# Patient Record
Sex: MALE | Employment: OTHER | URBAN - METROPOLITAN AREA
[De-identification: names, ages, dates, MRNs, and addresses within clinical notes are randomized per-mention and may not be internally consistent; named-entity substitution may affect disease eponyms.]

---

## 2017-04-03 DIAGNOSIS — L21.9 DERMATITIS, SEBORRHEIC: ICD-10-CM

## 2017-04-03 DIAGNOSIS — L71.9 ACNE ROSACEA: ICD-10-CM

## 2017-04-03 RX ORDER — FLUOCINONIDE TOPICAL SOLUTION USP, 0.05% 0.5 MG/ML
SOLUTION TOPICAL 2 TIMES DAILY
Qty: 60 ML | Refills: 3 | Status: SHIPPED | OUTPATIENT
Start: 2017-04-03 | End: 2018-04-30

## 2017-04-06 DIAGNOSIS — L71.9 ACNE ROSACEA: Primary | ICD-10-CM

## 2017-04-19 ENCOUNTER — OFFICE VISIT (OUTPATIENT)
Dept: DERMATOLOGY | Facility: CLINIC | Age: 77
End: 2017-04-19

## 2017-04-19 VITALS — HEART RATE: 55 BPM | SYSTOLIC BLOOD PRESSURE: 118 MMHG | DIASTOLIC BLOOD PRESSURE: 73 MMHG

## 2017-04-19 DIAGNOSIS — L71.9 ACNE ROSACEA: Primary | ICD-10-CM

## 2017-04-19 DIAGNOSIS — L30.9 DERMATITIS: ICD-10-CM

## 2017-04-19 RX ORDER — METOPROLOL TARTRATE 25 MG/1
25 TABLET, FILM COATED ORAL
COMMUNITY
Start: 2017-04-04 | End: 2023-02-08

## 2017-04-19 ASSESSMENT — PAIN SCALES - GENERAL: PAINLEVEL: NO PAIN (0)

## 2017-04-19 NOTE — NURSING NOTE
"Dermatology Rooming Note    Fredrick Quinn's goals for this visit include:   Chief Complaint   Patient presents with     Derm Problem     Fredrick is here today for a skin check. Fredrick notes\" I have a few concerns on my face and scalp\"     Natalya Dowell MA    "

## 2017-04-19 NOTE — PROGRESS NOTES
DERMATOLOGY PROBLEM LIST:   1.  Rosacea.  Recent switch from Azelex due to insurance to metronidazole cream.   2.  Eczematous dermatitis.   3.  Seborrheic dermatitis.   4.  History of basal cell carcinoma, forehead, status post Mohs 08/07/2015.      CHIEF COMPLAINT:  Spots.      HISTORY OF PRESENT ILLNESS:  The patient is a 76-year-old male well known to me who returns today earlier than expected for some concerning spots.  He does have a history of nonmelanoma skin cancer.  I have lately been corresponding with the patient due to refills.  He needed a refill on both his ketoconazole shampoo and Lidex solution he uses when he has a flare of scalp pruritus.  He feels that they help but he does still occasionally get pimples within the area.  His insurance made a switch from azelaic acid which had controlled him well for many years to MetroCream; he has not started it yet.  He does have a few areas flaring on his chin and nose.  His eczema continues to be under good control.  He is doing daily emollients.  He uses his Diprolene and desoximetasone and/or betamethasone for symptoms and this has been doing well for him.  He has noticed some thinning of his skin.  The 2 areas that he has of concern today are a brown patch on his cheek and a mole area that he feels in the shower under his left axilla.      PHYSICAL EXAMINATION:  The patient is a well-appearing male in no acute distress.  Please note the nursing note for vital signs.  Scalp, face, trunk, upper and lower extremities are examined today.  He has a few scattered benign-appearing nevi, no atypia.  In his left axilla, he has a fleshy dermal nevus without atypia.  On his right cheek, he has a waxy, stuck-on papule.  On dermoscopy, it has features of seborrheic keratosis.  He has scattered inflammatory papules and pustules of his nose and chin.  Scalp today is clear.  No eczematous plaques and he is well moisturized.      ASSESSMENT AND PLAN:   1.  Eczematous  dermatitis, well controlled.  Continue emollients, betamethasone, Diprolene and desoximetasone as needed.   2.  Seborrheic dermatitis.  Although the patient feels that he still has some areas active, he is doing very well.  He will continue the ketoconazole shampoo.  He is using it 5 times weekly and Lidex solution as needed for flares.   3.  Rosacea.  He will start his metronidazole cream.  I did discuss sometimes people with rosacea respond to one product but not the other.  If it is not kicking in, in 8 weeks, he is to let me know.   4.  Seborrheic keratosis, right cheek.  Reassurance.   5.  Intradermal nevus, left axilla.  Reassurance.     I will plan to see the patient back in 1 year.

## 2017-04-19 NOTE — LETTER
4/19/2017       RE: Fredrick Quinn  [address] [address]     Dear Colleague,    Thank you for referring your patient, Fredrick Quinn, to the [facility] DERMATOLOGY at [facility]. Please see a copy of my visit note below.    DERMATOLOGY PROBLEM LIST:   1.  Rosacea.  Recent switch from Azelex due to insurance to metronidazole cream.   2.  Eczematous dermatitis.   3.  Seborrheic dermatitis.   4.  History of basal cell carcinoma, forehead, status post Mohs 08/07/2015.      CHIEF COMPLAINT:  Spots.      HISTORY OF PRESENT ILLNESS:  The patient is a 76-year-old male well known to me who returns today earlier than expected for some concerning spots.  He does have a history of nonmelanoma skin cancer.  I have lately been corresponding with the patient due to refills.  He needed a refill on both his ketoconazole shampoo and Lidex solution he uses when he has a flare of scalp pruritus.  He feels that they help but he does still occasionally get pimples within the area.  His insurance made a switch from azelaic acid which had controlled him well for many years to MetroCream; he has not started it yet.  He does have a few areas flaring on his chin and nose.  His eczema continues to be under good control.  He is doing daily emollients.  He uses his Diprolene and desoximetasone and/or betamethasone for symptoms and this has been doing well for him.  He has noticed some thinning of his skin.  The 2 areas that he has of concern today are a brown patch on his cheek and a mole area that he feels in the shower under his left axilla.      PHYSICAL EXAMINATION:  The patient is a well-appearing male in no acute distress.  Please note the nursing note for vital signs.  Scalp, face, trunk, upper and lower extremities are examined today.  He has a few scattered benign-appearing nevi, no atypia.  In his left axilla, he has a fleshy dermal nevus without atypia.  On his right cheek, he  has a waxy, stuck-on papule.  On dermoscopy, it has features of seborrheic keratosis.  He has scattered inflammatory papules and pustules of his nose and chin.  Scalp today is clear.  No eczematous plaques and he is well moisturized.      ASSESSMENT AND PLAN:   1.  Eczematous dermatitis, well controlled.  Continue emollients, betamethasone, Diprolene and desoximetasone as needed.   2.  Seborrheic dermatitis.  Although the patient feels that he still has some areas active, he is doing very well.  He will continue the ketoconazole shampoo.  He is using it 5 times weekly and Lidex solution as needed for flares.   3.  Rosacea.  He will start his metronidazole cream.  I did discuss sometimes people with rosacea respond to one product but not the other.  If it is not kicking in, in 8 weeks, he is to let me know.   4.  Seborrheic keratosis, right cheek.  Reassurance.   5.  Intradermal nevus, left axilla.  Reassurance.     I will plan to see the patient back in 1 year.     Again, thank you for allowing me to participate in the care of your patient.      Sincerely,    Coleen Simth MD

## 2017-04-19 NOTE — MR AVS SNAPSHOT
After Visit Summary   4/19/2017    Fredrick Quinn    MRN: [id number]           Patient Information     Date Of Birth          1940        Visit Information        Provider Department      4/19/2017 8:45 AM Coleen Smith MD [facility] Dermatology        Today's Diagnoses     Acne rosacea    -  1    Dermatitis           Follow-ups after your visit        Follow-up notes from your care team     Return in about 1 year (around 4/19/2018).      Who to contact     Please call your clinic at [phone] to:    Ask questions about your health    Make or cancel appointments    Discuss your medicines    Learn about your test results    Speak to your doctor   If you have compliments or concerns about an experience at your clinic, or if you wish to file a complaint, please contact [facility] Patient Relations at [phone] or email us at [email or web address]         Additional Information About Your Visit        MyChart Information     [employer] gives you secure access to your electronic health record. If you see a primary care provider, you can also send messages to your care team and make appointments. If you have questions, please call your primary care clinic.  If you do not have a primary care provider, please call [phone] and they will assist you.      [employer] is an electronic gateway that provides easy, online access to your medical records. With [employer], you can request a clinic appointment, read your test results, renew a prescription or communicate with your care team.     To access your existing account, please contact your [facility] or call [phone] for assistance.        Care EveryWhere ID     This is your Care EveryWhere ID. This could be used by other organizations to access your [place] medical records  [id number]        Your Vitals Were     Pulse                   55            Blood Pressure from Last 3  Encounters:   04/19/17 118/73   09/20/16 110/68   08/07/15 118/67    Weight from Last 3 Encounters:   07/21/15 74.9 kg (165 lb 3.2 oz)   07/22/14 75.5 kg (166 lb 6.4 oz)   08/12/13 76.7 kg (169 lb 3.2 oz)              Today, you had the following     No orders found for display         Today's Medication Changes          These changes are accurate as of: 4/19/17 11:59 PM.  If you have any questions, ask your nurse or doctor.               These medicines have changed or have updated prescriptions.        Dose/Directions    betamethasone dipropionate 0.05 % cream   Commonly known as:  DIPROSONE   This may have changed:  Another medication with the same name was removed. Continue taking this medication, and follow the directions you see here.   Used for:  Other eczema   Changed by:  Coleen Smith MD        Apply 0.5 inches topically 2 times daily.   Quantity:  180 g   Refills:  4                Primary Care Provider Office Phone # Fax #    Sahil Corky Salvador [phone]       [facility] [address]  [place] [address]        Thank you!     Thank you for choosing [facility] DERMATOLOGY  for your care. Our goal is always to provide you with excellent care. Hearing back from our patients is one way we can continue to improve our services. Please take a few minutes to complete the written survey that you may receive in the mail after your visit with us. Thank you!             Your Updated Medication List - Protect others around you: Learn how to safely use, store and throw away your medicines at www.disposemymeds.org.          This list is accurate as of: 4/19/17 11:59 PM.  Always use your most recent med list.                   Brand Name Dispense Instructions for use    aspirin 325 MG tablet      Take 1 tablet by mouth daily       azelaic acid 20 % cream    AZELEX    50 g    Apply to face daily.       betamethasone dipropionate 0.05 % cream    DIPROSONE    180 g    Apply 0.5 inches  topically 2 times daily.       Desoximetasone 0.05 % Crea     60 g    Use daily to rash       FLAXSEED OIL PO      Take by mouth daily       fluocinonide 0.05 % solution    LIDEX    60 mL    Apply topically 2 times daily       furosemide 20 MG tablet    LASIX     Take 20 mg by mouth daily       ketoconazole 2 % shampoo    NIZORAL    120 mL    Use daily to scalp       LIPITOR 80 MG tablet   Generic drug:  atorvastatin      Take 80 mg by mouth daily       losartan 25 MG tablet    COZAAR     Take 25 mg by mouth daily       metoprolol 25 MG 24 hr tablet    TOPROL-XL     Take 25 mg by mouth 2 times daily       metoprolol 25 MG tablet    LOPRESSOR     Take 25 mg by mouth       metroNIDAZOLE 0.75 % cream    METROCREAM    45 g    Apply topically 2 times daily       MULTIVITAL PO      Take 1 tablet by mouth daily       omeprazole 40 MG capsule    priLOSEC     Take 40 mg by mouth daily       potassium chloride SA 10 MEQ CR tablet    K-DUR/KLOR-CON M     Take by mouth 2 times daily

## 2017-06-29 ENCOUNTER — TELEPHONE (OUTPATIENT)
Dept: DERMATOLOGY | Facility: CLINIC | Age: 77
End: 2017-06-29

## 2017-06-29 NOTE — TELEPHONE ENCOUNTER
PA Initiation    Medication: azelaic acid (Azelex) 20% cream  Insurance Company: [employer] - Phone [phone] Fax [phone]  Pharmacy Filling the Rx: [employer] [facility] - [facility] [address]  Filling Pharmacy Phone:    Filling Pharmacy Fax:    Start Date:  6/29/17    ** Pt on Azelex since 2012 but plan no longer. Med worked well for pt. Switched to Metrocream in April for rosacea and not controlling symptoms.

## 2017-07-05 NOTE — TELEPHONE ENCOUNTER
PRIOR AUTHORIZATION DENIED    Medication: azelaic acid (Azelex) 20% cream    Denial Date: 6/30/2017    Denial Rational: Pt hasn't tried and failed all listed first-line therapies    Appeal Information: Yes - mail or fax

## 2017-07-07 NOTE — TELEPHONE ENCOUNTER
Medication Appeal Initiation    We have initiated an appeal for the requested medication:  Medication: azelaic acid (Azelex) 20% cream - DENIED, APPEALING  Appeal Start Date:  7/7/2017  Insurance Company: [employer] - Phone [phone] Fax [phone]  Comments:   Appeal with LMN and chart notes faxed to [employer] via fax# [phone]

## 2017-07-11 NOTE — TELEPHONE ENCOUNTER
MEDICATION APPEAL APPROVED    Medication: azelaic acid (Azelex) 20% cream - DENIED, APPEAL approved  Authorization Effective Date: 7/10/2017  Authorization Expiration Date: 12/31/2017  Approved Dose/Quantity:   Reference #: [id number]   Insurance Company: [employer] - Phone [phone] Fax [phone]  Expected CoPay: n/a     CoPay Card Available:      Foundation Assistance Needed:    Which Pharmacy is filling the prescription (Not needed for infusion/clinic administered): [employer] [facility] - [facility] [address]    Left pt a message to contact his pharmacy to set up delivery.

## 2017-09-26 ENCOUNTER — OFFICE VISIT (OUTPATIENT)
Dept: DERMATOLOGY | Facility: CLINIC | Age: 77
End: 2017-09-26

## 2017-09-26 DIAGNOSIS — L73.9 FOLLICULITIS: Primary | ICD-10-CM

## 2017-09-26 DIAGNOSIS — L21.9 DERMATITIS, SEBORRHEIC: ICD-10-CM

## 2017-09-26 DIAGNOSIS — L71.9 ACNE ROSACEA: ICD-10-CM

## 2017-09-26 DIAGNOSIS — Z85.828 HISTORY OF NONMELANOMA SKIN CANCER: ICD-10-CM

## 2017-09-26 DIAGNOSIS — L30.8 OTHER ECZEMA: ICD-10-CM

## 2017-09-26 RX ORDER — CLINDAMYCIN PHOSPHATE 11.9 MG/ML
SOLUTION TOPICAL
Qty: 60 ML | Refills: 1 | Status: SHIPPED | OUTPATIENT
Start: 2017-09-26 | End: 2018-04-30

## 2017-09-26 ASSESSMENT — PAIN SCALES - GENERAL: PAINLEVEL: NO PAIN (0)

## 2017-09-26 NOTE — LETTER
9/26/2017       RE: Fredrick Quinn  [address] [address]     Dear Colleague,    Thank you for referring your patient, Fredrick Quinn, to the [facility] DERMATOLOGY at [facility]. Please see a copy of my visit note below.      [facility] Dermatology Note      Dermatology Problem List:  1. Rosacea.     - Recent switch from Azeleic acid due to insurance to metronidazole cream.   - prior auth approved but AA is still expensive so pt continuing to use metrogel   2.  Eczematous dermatitis.    - emollients, betamethasone, Diprolene and desoximetasone as needed.   3.  Seborrheic dermatitis.   4.  History of basal cell carcinoma, forehead, status post Mohs 08/07/2015.     Encounter Date: Sep 26, 2017    CC:   Chief Complaint   Patient presents with     Derm Problem     Fredrick is here for a skin check.  States he has concerning areas on his scalp.            History of Present Illness:  Mr. Fredrick Quinn is a 77 year old male who returns today in f/u for dermatitis and rosacea. He does have a history of nonmelanoma skin cancer as well.   He reports using ketoconazole shampoo and Lidex solution about 5x/weeek and reports that he is still getting pustules in the hair though he does not have any at today's visit.      His insurance made him switch from azelaic acid which had controlled him well for many years to MetroCream. He did have a prior auth submitted and this was approved but due to communication issues with the pharmacy we was going to be charged $700 per 50gm tube. He was able get it down to $245 but this is still much more than the $180 that he was spending before so he is trying to decide if it is worth it as he has had some (though lesser) success with the Metrogel. He tends to flare with ETOH, and hot beverages.     His eczema continues to be under good control.  He is doing daily emollients with vanicream in the winter and cetaphil in  the summer.  He uses his desoximetasone as first line and betamethasone second line for symptoms and this has been doing well for him. He is usually able to get his skin under control in less than a week with these topicals. He has noticed some thinning of his skin, particularly in the legs.     Past Medical History:   Patient Active Problem List   Diagnosis     Acne     Basal cell carcinoma, forehead s/p mohs 8-7-15     History reviewed. No pertinent past medical history.  History reviewed. No pertinent surgical history.      Medications:  Current Outpatient Prescriptions   Medication Sig Dispense Refill     clindamycin (CLEOCIN T) 1 % solution To the scalp as needed for pustules 60 mL 1     azelaic acid (AZELEX) 20 % cream Apply to face daily. 50 g 11     metoprolol (LOPRESSOR) 25 MG tablet Take 25 mg by mouth       metroNIDAZOLE (METROCREAM) 0.75 % cream Apply topically 2 times daily 45 g 11     fluocinonide (LIDEX) 0.05 % solution Apply topically 2 times daily 60 mL 3     betamethasone dipropionate (DIPROSONE) 0.05 % cream Apply 0.5 inches topically 2 times daily. 180 g 4     Desoximetasone 0.05 % CREA Use daily to rash 60 g 12     ketoconazole (NIZORAL) 2 % shampoo Use daily to scalp 120 mL 120     furosemide (LASIX) 20 MG tablet Take 20 mg by mouth daily       potassium chloride SA (K-DUR,KLOR-CON M) 10 MEQ tablet Take by mouth 2 times daily       losartan (COZAAR) 25 MG tablet Take 25 mg by mouth daily       atorvastatin (LIPITOR) 80 MG tablet Take 80 mg by mouth daily       aspirin 325 MG tablet Take 1 tablet by mouth daily       omeprazole (PRILOSEC) 40 MG capsule Take 40 mg by mouth daily       Multiple Vitamins-Minerals (MULTIVITAL PO) Take 1 tablet by mouth daily       Flaxseed, Linseed, (FLAXSEED OIL PO) Take by mouth daily          Allergies   Allergen Reactions     Contrast Dye Anaphylaxis     Pantoprazole Nausea     Diatrizoate Swelling     FLUSHING     Iohexol      Propranolol      Shellfish Allergy       Spironolactone Muscle Pain (Myalgia)     myalgia         Review of Systems:  -As per HPI  -Constitutional: The patient denies fatigue, fevers, chills, unintended weight loss, and night sweats.  -HEENT: Patient denies nonhealing oral sores.  -Skin: As above in HPI. No additional skin concerns.    Physical exam:  Vitals: There were no vitals taken for this visit.  GEN: This is a well developed, well-nourished male in no acute distress, in a pleasant mood.    SKIN: Total skin excluding the undergarment areas was performed. The exam included the head/face, neck, both arms, chest, back, abdomen, both legs, digits and/or nails.   - There are no active acneiform lesions on the face including papules and pustules.   - on the b/l shins there is thinning of the skin with erosions  - there is an area of PIH on the low midline back with areas of minpoint hemorrhagic crust  - there are no papules or pustules within the scalp but there is some hypopigmentation diffusely  -No other lesions of concern on areas examined.     Impression/Plan:    1. Eczematous dermatitis    well controlled.  Continue emollients, betamethasone, Diprolene and desoximetasone as needed    2. Rosacea    Continue Metrogel or purchase azeleic acid. A paper script of AA was printed so the patient can obtain this from [facility] in [place] if desired    3. Seborrheic dermatitis / Folliculitis    continue ketoconazole shampoo.  He is using it 5 times weekly and Lidex solution as needed for flares.    Given the report of pustules, starting clindamycin solution PRN     4. History of nonmelanoma skin cancer, no clincial evidence of recurrence      Dr. Smith staffed the patient.    Staff Involved:  Resident(Jeanne Fountain)/Staff(as above)  .I, Coleen Smith MD, saw this patient with the resident and agree with the resident s findings and plan of care as documented in the resident s note.    Again, thank you for allowing me to participate in  the care of your patient.      Sincerely,    Coleen Smith MD

## 2017-09-26 NOTE — PATIENT INSTRUCTIONS
Continue to use betamethasone and desoximetasone for eczema on the back    Continue ketoconazole shampoo and lidex for the redness and scaling of the face    You can use azeleic acid and metronidazole for rosacea on the face

## 2017-09-26 NOTE — MR AVS SNAPSHOT
After Visit Summary   9/26/2017    Fredrick Quinn    MRN: [id number]           Patient Information     Date Of Birth          1940        Visit Information        Provider Department      9/26/2017 10:15 AM Coleen Smith MD [facility] Dermatology        Today's Diagnoses     Folliculitis    -  1    Acne rosacea        History of nonmelanoma skin cancer        Dermatitis, seborrheic        Other eczema          Care Instructions    Continue to use betamethasone and desoximetasone for eczema on the back    Continue ketoconazole shampoo and lidex for the redness and scaling of the face    You can use azeleic acid and metronidazole for rosacea on the face          Follow-ups after your visit        Follow-up notes from your care team     Return in about 1 year (around 9/26/2018).      Who to contact     Please call your clinic at [phone] to:    Ask questions about your health    Make or cancel appointments    Discuss your medicines    Learn about your test results    Speak to your doctor   If you have compliments or concerns about an experience at your clinic, or if you wish to file a complaint, please contact [facility] Patient Relations at [phone] or email us at [email or web address]         Additional Information About Your Visit        MyChart Information     [employer] gives you secure access to your electronic health record. If you see a primary care provider, you can also send messages to your care team and make appointments. If you have questions, please call your primary care clinic.  If you do not have a primary care provider, please call [phone] and they will assist you.      [employer] is an electronic gateway that provides easy, online access to your medical records. With [employer], you can request a clinic appointment, read your test results, renew a prescription or communicate with your care team.     To access your existing account,  please contact your [facility] or call [phone] for assistance.        Care EveryWhere ID     This is your Care EveryWhere ID. This could be used by other organizations to access your [place] medical records  [id number]         Blood Pressure from Last 3 Encounters:   04/19/17 118/73   09/20/16 110/68   08/07/15 118/67    Weight from Last 3 Encounters:   07/21/15 74.9 kg (165 lb 3.2 oz)   07/22/14 75.5 kg (166 lb 6.4 oz)   08/12/13 76.7 kg (169 lb 3.2 oz)              Today, you had the following     No orders found for display         Today's Medication Changes          These changes are accurate as of: 9/26/17 11:06 AM.  If you have any questions, ask your nurse or doctor.               Start taking these medicines.        Dose/Directions    clindamycin 1 % solution   Commonly known as:  CLEOCIN T   Used for:  Folliculitis   Started by:  Coleen Smith MD        To the scalp as needed for pustules   Quantity:  60 mL   Refills:  1         Stop taking these medicines if you haven't already. Please contact your care team if you have questions.     metoprolol 25 MG 24 hr tablet   Commonly known as:  TOPROL-XL   Stopped by:  Coleen Smith MD                Where to get your medicines      These medications were sent to [facility] [address] [place] [address] [address] [place] [address] [address], [facility] [address]    Hours:  24-hours Phone:  [phone]     clindamycin 1 % solution         Some of these will need a paper prescription and others can be bought over the counter.  Ask your nurse if you have questions.     Bring a paper prescription for each of these medications     azelaic acid 20 % cream                Primary Care Provider Office Phone # Fax #    Sahil Salvador [phone]       [facility] [address]  [facility] [address]        Equal Access to Services     MARLENY VENTURA: Gloria  robby Armstrong, wanarenda luqadaha, qaybta kaalmada juana, landen mikalain hayaaclara wagnerelena gordofred laHenryartem nicole. So [facility] [place] [facility] [phone].    ATENCIÓN: Si habla anmol, tiene a salinas disposición servicios gratuitos de asistencia lingüística. Demetri al [phone].    We comply with applicable federal civil rights laws and [place] laws. We do not discriminate on the basis of race, color, national origin, age, disability sex, sexual orientation or gender identity.            Thank you!     Thank you for choosing [facility] DERMATOLOGY  for your care. Our goal is always to provide you with excellent care. Hearing back from our patients is one way we can continue to improve our services. Please take a few minutes to complete the written survey that you may receive in the mail after your visit with us. Thank you!             Your Updated Medication List - Protect others around you: Learn how to safely use, store and throw away your medicines at www.disposemymeds.org.          This list is accurate as of: 9/26/17 11:06 AM.  Always use your most recent med list.                   Brand Name Dispense Instructions for use Diagnosis    aspirin 325 MG tablet      Take 1 tablet by mouth daily        azelaic acid 20 % cream    AZELEX    50 g    Apply to face daily.    Acne rosacea       betamethasone dipropionate 0.05 % cream    DIPROSONE    180 g    Apply 0.5 inches topically 2 times daily.    Other eczema       clindamycin 1 % solution    CLEOCIN T    60 mL    To the scalp as needed for pustules    Folliculitis       Desoximetasone 0.05 % Crea     60 g    Use daily to rash    Dermatitis       FLAXSEED OIL PO      Take by mouth daily        fluocinonide 0.05 % solution    LIDEX    60 mL    Apply topically 2 times daily    Dermatitis, seborrheic       furosemide 20 MG tablet    LASIX     Take 20 mg by mouth daily        ketoconazole 2 % shampoo    NIZORAL    120 mL    Use daily to scalp    Dermatitis, seborrheic       LIPITOR 80 MG  tablet   Generic drug:  atorvastatin      Take 80 mg by mouth daily        losartan 25 MG tablet    COZAAR     Take 25 mg by mouth daily        metoprolol 25 MG tablet    LOPRESSOR     Take 25 mg by mouth        metroNIDAZOLE 0.75 % cream    METROCREAM    45 g    Apply topically 2 times daily    Acne rosacea       MULTIVITAL PO      Take 1 tablet by mouth daily        omeprazole 40 MG capsule    priLOSEC     Take 40 mg by mouth daily        potassium chloride SA 10 MEQ CR tablet    K-DUR/KLOR-CON M     Take by mouth 2 times daily

## 2017-09-26 NOTE — NURSING NOTE
Dermatology Rooming Note    Fredrick Quinn's goals for this visit include:   Chief Complaint   Patient presents with     Derm Problem     Fredrick is here for a skin check.  States he has concerning areas on his scalp.          Sabrina Leyva LPN

## 2017-09-26 NOTE — PROGRESS NOTES
[facility] Dermatology Note      Dermatology Problem List:  1. Rosacea.     - Recent switch from Azeleic acid due to insurance to metronidazole cream.   - prior auth approved but AA is still expensive so pt continuing to use metrogel   2.  Eczematous dermatitis.    - emollients, betamethasone, Diprolene and desoximetasone as needed.   3.  Seborrheic dermatitis.   4.  History of basal cell carcinoma, forehead, status post Mohs 08/07/2015.     Encounter Date: Sep 26, 2017    CC:   Chief Complaint   Patient presents with     Derm Problem     Fredrick is here for a skin check.  States he has concerning areas on his scalp.            History of Present Illness:  Mr. Fredrick Quinn is a 77 year old male who returns today in f/u for dermatitis and rosacea. He does have a history of nonmelanoma skin cancer as well.   He reports using ketoconazole shampoo and Lidex solution about 5x/weeek and reports that he is still getting pustules in the hair though he does not have any at today's visit.      His insurance made him switch from azelaic acid which had controlled him well for many years to MetroCream. He did have a prior auth submitted and this was approved but due to communication issues with the pharmacy we was going to be charged $700 per 50gm tube. He was able get it down to $245 but this is still much more than the $180 that he was spending before so he is trying to decide if it is worth it as he has had some (though lesser) success with the Metrogel. He tends to flare with ETOH, and hot beverages.     His eczema continues to be under good control.  He is doing daily emollients with vanicream in the winter and cetaphil in the summer.  He uses his desoximetasone as first line and betamethasone second line for symptoms and this has been doing well for him. He is usually able to get his skin under control in less than a week with these topicals. He has noticed some thinning of his skin, particularly in  the legs.     Past Medical History:   Patient Active Problem List   Diagnosis     Acne     Basal cell carcinoma, forehead s/p mohs 8-7-15     History reviewed. No pertinent past medical history.  History reviewed. No pertinent surgical history.      Medications:  Current Outpatient Prescriptions   Medication Sig Dispense Refill     clindamycin (CLEOCIN T) 1 % solution To the scalp as needed for pustules 60 mL 1     azelaic acid (AZELEX) 20 % cream Apply to face daily. 50 g 11     metoprolol (LOPRESSOR) 25 MG tablet Take 25 mg by mouth       metroNIDAZOLE (METROCREAM) 0.75 % cream Apply topically 2 times daily 45 g 11     fluocinonide (LIDEX) 0.05 % solution Apply topically 2 times daily 60 mL 3     betamethasone dipropionate (DIPROSONE) 0.05 % cream Apply 0.5 inches topically 2 times daily. 180 g 4     Desoximetasone 0.05 % CREA Use daily to rash 60 g 12     ketoconazole (NIZORAL) 2 % shampoo Use daily to scalp 120 mL 120     furosemide (LASIX) 20 MG tablet Take 20 mg by mouth daily       potassium chloride SA (K-DUR,KLOR-CON M) 10 MEQ tablet Take by mouth 2 times daily       losartan (COZAAR) 25 MG tablet Take 25 mg by mouth daily       atorvastatin (LIPITOR) 80 MG tablet Take 80 mg by mouth daily       aspirin 325 MG tablet Take 1 tablet by mouth daily       omeprazole (PRILOSEC) 40 MG capsule Take 40 mg by mouth daily       Multiple Vitamins-Minerals (MULTIVITAL PO) Take 1 tablet by mouth daily       Flaxseed, Linseed, (FLAXSEED OIL PO) Take by mouth daily          Allergies   Allergen Reactions     Contrast Dye Anaphylaxis     Pantoprazole Nausea     Diatrizoate Swelling     FLUSHING     Iohexol      Propranolol      Shellfish Allergy      Spironolactone Muscle Pain (Myalgia)     myalgia         Review of Systems:  -As per HPI  -Constitutional: The patient denies fatigue, fevers, chills, unintended weight loss, and night sweats.  -HEENT: Patient denies nonhealing oral sores.  -Skin: As above in HPI. No  additional skin concerns.    Physical exam:  Vitals: There were no vitals taken for this visit.  GEN: This is a well developed, well-nourished male in no acute distress, in a pleasant mood.    SKIN: Total skin excluding the undergarment areas was performed. The exam included the head/face, neck, both arms, chest, back, abdomen, both legs, digits and/or nails.   - There are no active acneiform lesions on the face including papules and pustules.   - on the b/l shins there is thinning of the skin with erosions  - there is an area of PIH on the low midline back with areas of minpoint hemorrhagic crust  - there are no papules or pustules within the scalp but there is some hypopigmentation diffusely  -No other lesions of concern on areas examined.     Impression/Plan:    1. Eczematous dermatitis    well controlled.  Continue emollients, betamethasone, Diprolene and desoximetasone as needed    2. Rosacea    Continue Metrogel or purchase azeleic acid. A paper script of AA was printed so the patient can obtain this from [facility] in [place] if desired    3. Seborrheic dermatitis / Folliculitis    continue ketoconazole shampoo.  He is using it 5 times weekly and Lidex solution as needed for flares.    Given the report of pustules, starting clindamycin solution PRN     4. History of nonmelanoma skin cancer, no clincial evidence of recurrence      Dr. Smith staffed the patient.    Staff Involved:  Resident(Jeanne Fountain)/Staff(as above)  .I, Coleen Smith MD, saw this patient with the resident and agree with the resident s findings and plan of care as documented in the resident s note.

## 2017-10-05 DIAGNOSIS — L71.9 ACNE ROSACEA: Primary | ICD-10-CM

## 2017-10-05 RX ORDER — AZELAIC ACID 0.15 G/G
GEL TOPICAL
Qty: 50 G | Refills: 11 | Status: SHIPPED | OUTPATIENT
Start: 2017-10-05 | End: 2023-02-08

## 2018-04-26 DIAGNOSIS — L21.9 DERMATITIS, SEBORRHEIC: ICD-10-CM

## 2018-04-30 DIAGNOSIS — L73.9 FOLLICULITIS: ICD-10-CM

## 2018-04-30 DIAGNOSIS — L21.9 DERMATITIS, SEBORRHEIC: ICD-10-CM

## 2018-04-30 DIAGNOSIS — L30.9 DERMATITIS: ICD-10-CM

## 2018-04-30 DIAGNOSIS — L30.8 OTHER ECZEMA: ICD-10-CM

## 2018-04-30 RX ORDER — MOMETASONE FUROATE 1 MG/ML
SOLUTION TOPICAL
Qty: 60 ML | Refills: 3 | Status: SHIPPED | OUTPATIENT
Start: 2018-04-30 | End: 2023-02-08

## 2018-04-30 RX ORDER — FLUOCINONIDE TOPICAL SOLUTION USP, 0.05% 0.5 MG/ML
SOLUTION TOPICAL
Qty: 60 ML | Refills: 3 | Status: CANCELLED | OUTPATIENT
Start: 2018-04-30

## 2018-04-30 NOTE — TELEPHONE ENCOUNTER
Last visit: 9/26/2017  Next visit: recall for 9/28/2018      Impression/Plan:     1. Eczematous dermatitis    well controlled.  Continue emollients, betamethasone, Diprolene and desoximetasone as needed   2. Rosacea    Continue Metrogel or purchase azeleic acid. A paper script of AA was printed so the patient can obtain this from [facility] in [place] if desired     3. Seborrheic dermatitis / Folliculitis    continue ketoconazole shampoo.  He is using it 5 times weekly and Lidex solution as needed for flares.    Given the report of pustules, starting clindamycin solution PRN      4. History of nonmelanoma skin cancer, no clincial evidence of recurrence        Dr. Smith staffed the patient.

## 2018-04-30 NOTE — TELEPHONE ENCOUNTER
LOV:9/26/17  NOV: November recall     1. Eczematous dermatitis    well controlled.  Continue emollients, betamethasone, Diprolene and desoximetasone as needed   2. Rosacea    Continue Metrogel or purchase azeleic acid. A paper script of AA was printed so the patient can obtain this from [facility] in [place] if desired     3. Seborrheic dermatitis / Folliculitis    continue ketoconazole shampoo.  He is using it 5 times weekly and Lidex solution as needed for flares.    Given the report of pustules, starting clindamycin solution PRN      4. History of nonmelanoma skin cancer, no clincial evidence of recurrence

## 2018-04-30 NOTE — TELEPHONE ENCOUNTER
Received refill request for fluocinonide solution as the resident on call. Reviewed patient's chart and attached communication. Patient last seen 9/26/17 for seborrheic dermatitis, among other things. RTC November 2018. Unfortunately, the fluocinonide is not on the patient's formulary and would be quite expensive. I called the patient and spoke with him about alternatives. After this discussion and reviewing the medication list and assessment and plan from last visit, an alternative prescription for mometasone solution was sent to his pharmacy. He will call if he has any difficulties or finds the new medication is not working as well.     Joe Lowe MD  Dermatology Resident, PGY3  Pager: [phone]

## 2018-05-01 RX ORDER — BETAMETHASONE DIPROPIONATE 0.5 MG/G
CREAM TOPICAL
Qty: 180 G | Refills: 4 | Status: SHIPPED | OUTPATIENT
Start: 2018-05-01 | End: 2019-09-25

## 2018-05-01 RX ORDER — CLINDAMYCIN PHOSPHATE 11.9 MG/ML
SOLUTION TOPICAL
Qty: 60 ML | Refills: 1 | Status: SHIPPED | OUTPATIENT
Start: 2018-05-01 | End: 2018-08-13

## 2018-05-01 RX ORDER — KETOCONAZOLE 20 MG/ML
SHAMPOO TOPICAL
Qty: 120 ML | Refills: 120 | Status: SHIPPED | OUTPATIENT
Start: 2018-05-01 | End: 2018-08-13

## 2018-05-01 RX ORDER — DESOXIMETASONE 0.5 MG/G
CREAM TOPICAL
Qty: 60 G | Refills: 12 | Status: SHIPPED | OUTPATIENT
Start: 2018-05-01 | End: 2023-02-08

## 2018-08-13 ENCOUNTER — OFFICE VISIT (OUTPATIENT)
Dept: DERMATOLOGY | Facility: CLINIC | Age: 78
End: 2018-08-13
Payer: MEDICARE

## 2018-08-13 DIAGNOSIS — L21.9 DERMATITIS, SEBORRHEIC: ICD-10-CM

## 2018-08-13 DIAGNOSIS — L73.9 FOLLICULITIS: ICD-10-CM

## 2018-08-13 DIAGNOSIS — C44.319 BASAL CELL CARCINOMA, FOREHEAD: Primary | ICD-10-CM

## 2018-08-13 RX ORDER — KETOCONAZOLE 20 MG/ML
SHAMPOO TOPICAL
Qty: 120 ML | Refills: 12 | Status: SHIPPED | OUTPATIENT
Start: 2018-08-13 | End: 2020-08-12

## 2018-08-13 RX ORDER — CLINDAMYCIN PHOSPHATE 11.9 MG/ML
SOLUTION TOPICAL
Qty: 60 ML | Refills: 6 | Status: SHIPPED | OUTPATIENT
Start: 2018-08-13 | End: 2020-01-09

## 2018-08-13 ASSESSMENT — PAIN SCALES - GENERAL: PAINLEVEL: NO PAIN (0)

## 2018-08-13 NOTE — MR AVS SNAPSHOT
After Visit Summary   8/13/2018    Fredrick Quinn    MRN: [id number]           Patient Information     Date Of Birth          1940        Visit Information        Provider Department      8/13/2018 10:15 AM Coleen Smith MD [facility] Dermatology        Today's Diagnoses     Basal cell carcinoma, forehead s/p mohs 8-7-15    -  1      Care Instructions                      Follow-ups after your visit        Follow-up notes from your care team     Return in about 1 year (around 8/13/2019).      Who to contact     Please call your clinic at [phone] to:    Ask questions about your health    Make or cancel appointments    Discuss your medicines    Learn about your test results    Speak to your doctor            Additional Information About Your Visit        [employer] Information     [employer] gives you secure access to your electronic health record. If you see a primary care provider, you can also send messages to your care team and make appointments. If you have questions, please call your primary care clinic.  If you do not have a primary care provider, please call [phone] and they will assist you.      [employer] is an electronic gateway that provides easy, online access to your medical records. With [employer], you can request a clinic appointment, read your test results, renew a prescription or communicate with your care team.     To access your existing account, please contact your [facility] or call [phone] for assistance.        Care EveryWhere ID     This is your Care EveryWhere ID. This could be used by other organizations to access your [place] medical records  [id number]         Blood Pressure from Last 3 Encounters:   04/19/17 118/73   09/20/16 110/68   08/07/15 118/67    Weight from Last 3 Encounters:   07/21/15 74.9 kg (165 lb 3.2 oz)   07/22/14 75.5 kg (166 lb 6.4 oz)   08/12/13 76.7 kg (169 lb 3.2 oz)              Today, you had the  following     No orders found for display       Primary Care Provider Office Phone # Fax #    Sahil Salvador [phone]       [facility] [address]  [facility] [address]        Equal Access to Services     MARLENY BALDERAS : Hadii robby ku hadjaeo Sojeromeali, waaxda luqadaha, qaybta kaalmada adeelenayada, landen mccluren daniel wakefield laHenryartem rivera. So [facility] [phone].    ATENCIÓN: Si habla español, tiene a salinas disposición servicios gratuitos de asistencia lingüística. Llame al [phone].    We comply with applicable federal civil rights laws and [place] laws. We do not discriminate on the basis of race, color, national origin, age, disability, sex, sexual orientation, or gender identity.            Thank you!     Thank you for choosing [facility] DERMATOLOGY  for your care. Our goal is always to provide you with excellent care. Hearing back from our patients is one way we can continue to improve our services. Please take a few minutes to complete the written survey that you may receive in the mail after your visit with us. Thank you!             Your Updated Medication List - Protect others around you: Learn how to safely use, store and throw away your medicines at www.disposemymeds.org.          This list is accurate as of 8/13/18 10:37 AM.  Always use your most recent med list.                   Brand Name Dispense Instructions for use Diagnosis    aspirin 325 MG tablet      Take 1 tablet by mouth daily        Azelaic Acid 15 % gel     50 g    Use daily    Acne rosacea       azelaic acid 20 % cream    AZELEX    50 g    Apply to face daily.    Acne rosacea       betamethasone dipropionate 0.05 % cream    DIPROSONE    180 g    Apply 0.5 inches topically 2 times daily.    Other eczema       clindamycin 1 % solution    CLEOCIN T    60 mL    To the scalp as needed for pustules    Folliculitis       Desoximetasone 0.05 % Crea     60 g    Use daily to rash    Dermatitis       FLAXSEED OIL PO       Take by mouth daily        furosemide 20 MG tablet    LASIX     Take 20 mg by mouth daily        ketoconazole 2 % shampoo    NIZORAL    120 mL    Use daily to scalp    Dermatitis, seborrheic       LIPITOR 80 MG tablet   Generic drug:  atorvastatin      Take 80 mg by mouth daily        losartan 25 MG tablet    COZAAR     Take 25 mg by mouth daily        metoprolol tartrate 25 MG tablet    LOPRESSOR     Take 25 mg by mouth        metroNIDAZOLE 0.75 % cream    METROCREAM    45 g    Apply topically 2 times daily    Acne rosacea       mometasone 0.1 % solution (lotion)    ELOCON    60 mL    Apply topically twice daily as needed to scalp. Do not use on face.    Dermatitis, seborrheic       MULTIVITAL PO      Take 1 tablet by mouth daily        omeprazole 40 MG capsule    priLOSEC     Take 40 mg by mouth daily        potassium chloride SA 10 MEQ CR tablet    K-DUR/KLOR-CON M     Take by mouth 2 times daily

## 2018-08-13 NOTE — LETTER
8/13/2018       RE: Fredrick Quinn  [address] [facility] [address]     Dear Colleague,    Thank you for referring your patient, Fredrick Quinn, to the [facility] DERMATOLOGY at [facility]. Please see a copy of my visit note below.      [facility] Dermatology Note      Dermatology Problem List:  1. Rosacea: well-controlled   - continue with azelaic acid  2.  Eczematous dermatitis: well-controlled   - emollients, betamethasone, Diprolene and desoximetasone as needed.   3.  Seborrheic dermatitis: well-controlled   - continue with ketoconazole shampoo   - stop topical steroids as it may be flaring rosacea on scalp  4.  History of basal cell carcinoma, forehead, status post Mohs 08/07/2015.     Encounter Date: Aug 13, 2018    CC:   Chief Complaint   Patient presents with     Derm Problem     Fredrick is here for a skin check, states he has concerns on his scalp.           History of Present Illness:  Mr. Fredrick Quinn is a 78 year old male who returns today in f/u for dermatitis and rosacea. He was last seen on 9/26/17 at which time he was switched from azelaic acid to metronidazole cream for rosacea due to insurance issues. He does have a history of nonmelanoma skin cancer as well. He reports that he is still getting pustules in the hair. Feels that he continues to get infections. Has been using clindamycin and fluocinonide. Has stopped using fluocinonide because he thought it might be contributing to bumps and has since improved. Has been using ketoconazole 2% shampoo 5-6 times weekly. He tends to flare with ETOH, and hot beverages.     His eczema continues to be under good control.  He is doing daily emollients with vanicream in the winter and cetaphil in the summer.  He uses his desoximetasone as first line and betamethasone second line for symptoms and this has been doing well for him. He is usually able to get his skin under control in less than a  week with these topicals. He has noticed some thinning of his skin, particularly in the legs.     Patient denies any painful, bleeding, changing, or darkening lesions.    Patient reports daily use of SPF. Reports no history of tanning bed use. Reports distant history of blistering sunburns. No family history of skin cancer.       Past Medical History:   Patient Active Problem List   Diagnosis     Acne     Basal cell carcinoma, forehead s/p mohs 8-7-15     No past medical history on file.  No past surgical history on file.      Medications:  Current Outpatient Prescriptions   Medication Sig Dispense Refill     aspirin 325 MG tablet Take 1 tablet by mouth daily       atorvastatin (LIPITOR) 80 MG tablet Take 80 mg by mouth daily       azelaic acid (AZELEX) 20 % cream Apply to face daily. 50 g 11     Azelaic Acid 15 % gel Use daily 50 g 11     betamethasone dipropionate (DIPROSONE) 0.05 % cream Apply 0.5 inches topically 2 times daily. 180 g 4     clindamycin (CLEOCIN T) 1 % solution To the scalp as needed for pustules 60 mL 1     Desoximetasone 0.05 % CREA Use daily to rash 60 g 12     Flaxseed, Linseed, (FLAXSEED OIL PO) Take by mouth daily       furosemide (LASIX) 20 MG tablet Take 20 mg by mouth daily       ketoconazole (NIZORAL) 2 % shampoo Use daily to scalp 120 mL 120     losartan (COZAAR) 25 MG tablet Take 25 mg by mouth daily       metoprolol (LOPRESSOR) 25 MG tablet Take 25 mg by mouth       metroNIDAZOLE (METROCREAM) 0.75 % cream Apply topically 2 times daily 45 g 11     mometasone (ELOCON) 0.1 % solution (lotion) Apply topically twice daily as needed to scalp. Do not use on face. 60 mL 3     Multiple Vitamins-Minerals (MULTIVITAL PO) Take 1 tablet by mouth daily       omeprazole (PRILOSEC) 40 MG capsule Take 40 mg by mouth daily       potassium chloride SA (K-DUR,KLOR-CON M) 10 MEQ tablet Take by mouth 2 times daily          Allergies   Allergen Reactions     Contrast Dye Anaphylaxis     Pantoprazole Nausea      Diatrizoate Swelling     FLUSHING     Iohexol      Propranolol      Shellfish Allergy      Spironolactone Muscle Pain (Myalgia)     myalgia         Review of Systems:  -As per HPI  -Constitutional: The patient denies fatigue, fevers, chills, unintended weight loss, and night sweats.  -HEENT: Patient denies nonhealing oral sores.  -Skin: As above in HPI. No additional skin concerns.    Physical exam:  Vitals: There were no vitals taken for this visit.  GEN: This is a well developed, well-nourished male in no acute distress, in a pleasant mood.    SKIN: Total skin excluding the undergarment areas was performed. The exam included the head/face, neck, both arms, chest, back, abdomen, both legs, digits and/or nails.   - There is a well-healed scar on the forehead.   - There are no active acneiform lesions on the face including papules and pustules.   - There is a poorly-demarcated nonscaly bight red patch on the left lower back  - There are no papules or pustules within the scalp but there is some hypopigmentation diffusely  - There are well-demarcated healing red erosions admixed with red-brown macules on anterior shins.   - No other lesions of concern on areas examined.     Impression/Plan:    1. Eczematous dermatitis: well-controlled.     Continue emollients, betamethasone, Diprolene and desoximetasone as needed    2. Rosacea: well-controlled though still with pustules on scalp.     Continue with azelaic acid to face and scalp    Okay to stop fluocinonide as he feels that it is causing him to flare    3. Seborrheic dermatitis / Folliculitis    continue ketoconazole shampoo.  He is using it 5 times weekly.    Given the report of pustules, continue with clindamycin solution PRN     4. History of nonmelanoma skin cancer, no clincial evidence of recurrence    Follow up in 1 year for full skin check.     Dr. Smith staffed the patient.    Staff Involved:  Resident(Briana Bergman)/Staff(as above)    Briana Bergman,  M.D.  PGY-3 Resident  Dermatology  [facility]  .I, Coleen Smith MD, saw this patient with the resident and agree with the resident s findings and plan of care as documented in the resident s note.      Again, thank you for allowing me to participate in the care of your patient.      Sincerely,    Coleen Smith MD

## 2018-08-13 NOTE — NURSING NOTE
Dermatology Rooming Note    Fredrick Quinn's goals for this visit include:   Chief Complaint   Patient presents with     Derm Problem     Fredrick is here for a skin check, states he has concerns on his scalp.         Sabrina Leyva LPN

## 2018-08-13 NOTE — PROGRESS NOTES
[facility] Dermatology Note      Dermatology Problem List:  1. Rosacea: well-controlled   - continue with azelaic acid  2.  Eczematous dermatitis: well-controlled   - emollients, betamethasone, Diprolene and desoximetasone as needed.   3.  Seborrheic dermatitis: well-controlled   - continue with ketoconazole shampoo   - stop topical steroids as it may be flaring rosacea on scalp  4.  History of basal cell carcinoma, forehead, status post Mohs 08/07/2015.     Encounter Date: Aug 13, 2018    CC:   Chief Complaint   Patient presents with     Derm Problem     Fredrick is here for a skin check, states he has concerns on his scalp.           History of Present Illness:  Mr. Fredrick Quinn is a 78 year old male who returns today in f/u for dermatitis and rosacea. He was last seen on 9/26/17 at which time he was switched from azelaic acid to metronidazole cream for rosacea due to insurance issues. He does have a history of nonmelanoma skin cancer as well. He reports that he is still getting pustules in the hair. Feels that he continues to get infections. Has been using clindamycin and fluocinonide. Has stopped using fluocinonide because he thought it might be contributing to bumps and has since improved. Has been using ketoconazole 2% shampoo 5-6 times weekly. He tends to flare with ETOH, and hot beverages.     His eczema continues to be under good control.  He is doing daily emollients with vanicream in the winter and cetaphil in the summer.  He uses his desoximetasone as first line and betamethasone second line for symptoms and this has been doing well for him. He is usually able to get his skin under control in less than a week with these topicals. He has noticed some thinning of his skin, particularly in the legs.     Patient denies any painful, bleeding, changing, or darkening lesions.    Patient reports daily use of SPF. Reports no history of tanning bed use. Reports distant history of blistering  sunburns. No family history of skin cancer.       Past Medical History:   Patient Active Problem List   Diagnosis     Acne     Basal cell carcinoma, forehead s/p mohs 8-7-15     No past medical history on file.  No past surgical history on file.      Medications:  Current Outpatient Prescriptions   Medication Sig Dispense Refill     aspirin 325 MG tablet Take 1 tablet by mouth daily       atorvastatin (LIPITOR) 80 MG tablet Take 80 mg by mouth daily       azelaic acid (AZELEX) 20 % cream Apply to face daily. 50 g 11     Azelaic Acid 15 % gel Use daily 50 g 11     betamethasone dipropionate (DIPROSONE) 0.05 % cream Apply 0.5 inches topically 2 times daily. 180 g 4     clindamycin (CLEOCIN T) 1 % solution To the scalp as needed for pustules 60 mL 1     Desoximetasone 0.05 % CREA Use daily to rash 60 g 12     Flaxseed, Linseed, (FLAXSEED OIL PO) Take by mouth daily       furosemide (LASIX) 20 MG tablet Take 20 mg by mouth daily       ketoconazole (NIZORAL) 2 % shampoo Use daily to scalp 120 mL 120     losartan (COZAAR) 25 MG tablet Take 25 mg by mouth daily       metoprolol (LOPRESSOR) 25 MG tablet Take 25 mg by mouth       metroNIDAZOLE (METROCREAM) 0.75 % cream Apply topically 2 times daily 45 g 11     mometasone (ELOCON) 0.1 % solution (lotion) Apply topically twice daily as needed to scalp. Do not use on face. 60 mL 3     Multiple Vitamins-Minerals (MULTIVITAL PO) Take 1 tablet by mouth daily       omeprazole (PRILOSEC) 40 MG capsule Take 40 mg by mouth daily       potassium chloride SA (K-DUR,KLOR-CON M) 10 MEQ tablet Take by mouth 2 times daily          Allergies   Allergen Reactions     Contrast Dye Anaphylaxis     Pantoprazole Nausea     Diatrizoate Swelling     FLUSHING     Iohexol      Propranolol      Shellfish Allergy      Spironolactone Muscle Pain (Myalgia)     myalgia         Review of Systems:  -As per HPI  -Constitutional: The patient denies fatigue, fevers, chills, unintended weight loss, and night  sweats.  -HEENT: Patient denies nonhealing oral sores.  -Skin: As above in HPI. No additional skin concerns.    Physical exam:  Vitals: There were no vitals taken for this visit.  GEN: This is a well developed, well-nourished male in no acute distress, in a pleasant mood.    SKIN: Total skin excluding the undergarment areas was performed. The exam included the head/face, neck, both arms, chest, back, abdomen, both legs, digits and/or nails.   - There is a well-healed scar on the forehead.   - There are no active acneiform lesions on the face including papules and pustules.   - There is a poorly-demarcated nonscaly bight red patch on the left lower back  - There are no papules or pustules within the scalp but there is some hypopigmentation diffusely  - There are well-demarcated healing red erosions admixed with red-brown macules on anterior shins.   - No other lesions of concern on areas examined.     Impression/Plan:    1. Eczematous dermatitis: well-controlled.     Continue emollients, betamethasone, Diprolene and desoximetasone as needed    2. Rosacea: well-controlled though still with pustules on scalp.     Continue with azelaic acid to face and scalp    Okay to stop fluocinonide as he feels that it is causing him to flare    3. Seborrheic dermatitis / Folliculitis    continue ketoconazole shampoo.  He is using it 5 times weekly.    Given the report of pustules, continue with clindamycin solution PRN     4. History of nonmelanoma skin cancer, no clincial evidence of recurrence    Follow up in 1 year for full skin check.     Dr. Smith staffed the patient.    Staff Involved:  Resident(Briana Bergman)/Staff(as above)    Briana Bergman M.D.  PGY-3 Resident  Dermatology  [facility]  .I, Coleen Smith MD, saw this patient with the resident and agree with the resident s findings and plan of care as documented in the resident s note.

## 2019-05-29 DIAGNOSIS — L71.9 ACNE ROSACEA: ICD-10-CM

## 2019-06-12 DIAGNOSIS — L71.9 ACNE ROSACEA: Primary | ICD-10-CM

## 2019-06-13 RX ORDER — AZELAIC ACID 0.15 G/G
GEL TOPICAL
Qty: 50 G | Refills: 11 | Status: SHIPPED | OUTPATIENT
Start: 2019-06-13 | End: 2020-10-06

## 2019-09-25 ENCOUNTER — OFFICE VISIT (OUTPATIENT)
Dept: DERMATOLOGY | Facility: CLINIC | Age: 79
End: 2019-09-25
Payer: MEDICARE

## 2019-09-25 DIAGNOSIS — L30.8 OTHER ECZEMA: ICD-10-CM

## 2019-09-25 DIAGNOSIS — L71.9 ACNE ROSACEA: Primary | ICD-10-CM

## 2019-09-25 RX ORDER — AMIODARONE HYDROCHLORIDE 200 MG/1
100 TABLET ORAL
COMMUNITY
Start: 2019-06-19

## 2019-09-25 RX ORDER — DOXYCYCLINE 100 MG/1
100 CAPSULE ORAL 2 TIMES DAILY
Qty: 120 CAPSULE | Refills: 0 | Status: SHIPPED | OUTPATIENT
Start: 2019-09-25 | End: 2019-11-24

## 2019-09-25 RX ORDER — BETAMETHASONE DIPROPIONATE 0.5 MG/G
CREAM TOPICAL
Qty: 180 G | Refills: 4 | Status: SHIPPED | OUTPATIENT
Start: 2019-09-25 | End: 2021-07-13

## 2019-09-25 ASSESSMENT — PAIN SCALES - GENERAL: PAINLEVEL: NO PAIN (0)

## 2019-09-25 NOTE — PROGRESS NOTES
"[facility] Dermatology Note      Dermatology Problem List:  1. Rosacea: experiencing increased redness and papules/pustules  - Current treatment: azelaic acid 15% BID and oral doxycycline BID x8 weeks  2.  Eczematous dermatitis: well-controlled  - Current treatment: emollients and betamethasone 0.05%  3.  Seborrheic dermatitis with folliculitis: well-controlled  -Current treatment: ketoconazole shampoo and clindamycin 1% solution  4.  History of basal cell carcinoma, forehead, status post Mohs 08/07/2015.     Encounter Date: Sep 25, 2019    CC:  Chief Complaint   Patient presents with     Derm Problem     Fredrick is here for a skin check, states concerns with his rosacea.       History of Present Illness:  Mr. Fredrick Quinn is a 79 year old male with a history of NMSC skin cancer who presents for yearly full body skin check and follow-up of rosacea and eczematous dermatitis. He was last seen on 8/13/2018 when his eczema and rosacea were under good control.     Today he reports that his rosacea is worse. He used to experience redness only with certain triggers such as hot coffee or wine, but is now experiencing redness almost daily. He is also experiencing small, itchy bumps in his beard that are bothersome. He is currently using azelaic acid 15% twice daily to the face. He is also experiencing increased \"follicular infections\" and redness of his scalp. He uses clindamycin 1% and ketoconazole shampoo almost daily. When it flares, he will use azelaic acid on his scalp, approximately once a month. His eczema is currently well controlled with betamethasone which he uses daily when he has flare. His back is the most affected and currently has an affected area on his lower back. He is not using a daily moisturizer now, but plans to start Vanicream daily in the winter. He is otherwise doing well and has no changes to his medical history or medications since he was last seen. He has no other concerns " today.     Past Medical History:   Patient Active Problem List   Diagnosis     Acne     Basal cell carcinoma, forehead s/p mohs 8-7-15     Past Medical History:   Diagnosis Date     Basal cell carcinoma      No past surgical history on file.    Social History:  Patient reports that he has never smoked. He has never used smokeless tobacco.    Family History:  Family History   Problem Relation Age of Onset     Cancer No family hx of         no skin cancer     Skin Cancer No family hx of      Melanoma No family hx of        Medications:  Current Outpatient Medications   Medication Sig Dispense Refill     aspirin 325 MG tablet Take 1 tablet by mouth daily       atorvastatin (LIPITOR) 80 MG tablet Take 80 mg by mouth daily       azelaic acid (AZELEX) 20 % external cream Apply to face daily. 50 g 11     azelaic acid (FINACEA) 15 % external gel Apply to face daily 50 g 11     Azelaic Acid 15 % gel Use daily (Patient not taking: Reported on 8/13/2018) 50 g 11     betamethasone dipropionate (DIPROSONE) 0.05 % cream Apply 0.5 inches topically 2 times daily. 180 g 4     clindamycin (CLEOCIN T) 1 % solution To the scalp as needed for pustules 60 mL 6     Desoximetasone 0.05 % CREA Use daily to rash (Patient not taking: Reported on 8/13/2018) 60 g 12     Flaxseed, Linseed, (FLAXSEED OIL PO) Take by mouth daily       furosemide (LASIX) 20 MG tablet Take 20 mg by mouth daily       ketoconazole (NIZORAL) 2 % shampoo Use daily to scalp 120 mL 12     losartan (COZAAR) 25 MG tablet Take 25 mg by mouth daily       metoprolol (LOPRESSOR) 25 MG tablet Take 25 mg by mouth       metroNIDAZOLE (METROCREAM) 0.75 % cream Apply topically 2 times daily (Patient not taking: Reported on 8/13/2018) 45 g 11     mometasone (ELOCON) 0.1 % solution (lotion) Apply topically twice daily as needed to scalp. Do not use on face. (Patient not taking: Reported on 8/13/2018) 60 mL 3     Multiple Vitamins-Minerals (MULTIVITAL PO) Take 1 tablet by mouth daily        omeprazole (PRILOSEC) 40 MG capsule Take 40 mg by mouth daily       potassium chloride SA (K-DUR,KLOR-CON M) 10 MEQ tablet Take by mouth 2 times daily       Allergies   Allergen Reactions     Contrast Dye Anaphylaxis     Pantoprazole Nausea     Diatrizoate Swelling     FLUSHING     Iohexol      Propranolol      Shellfish Allergy      Spironolactone Muscle Pain (Myalgia)     myalgia         Review of Systems:  -Skin Establ Pt: The patient denies any new rash, pruritus, or lesions that are symptomatic, changing or bleeding, except as per HPI.  -Constitutional: Otherwise feeling well today, in usual state of health.  -HEENT: Patient denies nonhealing oral sores.  -Skin: As above in HPI. No additional skin concerns.    Physical exam:  Vitals: There were no vitals taken for this visit.  GEN: This is a well developed, well-nourished male in no acute distress, in a pleasant mood.    SKIN: Total skin excluding the undergarment areas was performed. The exam included the head/face, neck, both arms, chest, back, abdomen, both legs, digits and/or nails.   - Vivar skin type: II  - Hyperpigmented to slightly erythematous patch on lower back without overlying scale or excoriation.  - Scalp without erythema, scale, papules or pustules .  - Mild erythema of the forehead, nose, and cheeks with rare telangiectasias. No papules or pustules today.  - Mild erythema of the chin with overlying scale, within the beard. No papules or pustules today.  - No other lesions of concern on areas examined.     Impression/Plan:  1. Rosacea: Experiencing increased redness and papules despite twice daily azelaic acid. Discussed the risks and benefits of alternative regimens including a sulfa-based lotion, topical metronidazole, topical ivermectin, or an 8 week course of oral antibiotics. Patient elected to try a course of doxycycline.     Start doxycycline 100 mg, take twice daily for 8 weeks.    Continue azelaic acid 15%, apply to face twice  daily    2. Eczematous dermatitis: Currently well controlled.    Continue betamethasone 0.05%, apply to affected areas twice daily    Start a thick emollient, such as Vanicream cream, daily in the winter    3. Seborrheic dermatitis with folliculitis: Currently well controlled    Continue ketoconazole 2% shampoo, apply daily to scalp    Continue clindamycin 1% solution, apply to scalp daily as needed for pustules    4. History of nonmelanoma skin cancer, no clincial evidence of recurrence    Sun precaution was advised including the use of sun screens of SPF 30 or higher, sun protective clothing, and avoidance of tanning beds.    Follow-up in 1 year, earlier for new or changing lesions.     Staff Involved:  I, Brittanie Pulido, MS4, saw and examined the patient in the presence of Dr. Smith.       I was present with the medical student who participated in the service and in the documentation of the note. I have verified the history and personally performed the physical exam and medical decision making. I agree with the assessment and plan of care as documented in the note.  Coleen Smith MD

## 2019-09-25 NOTE — LETTER
"9/25/2019       RE: Fredrick Quinn  [address] [address]     Dear Colleague,    Thank you for referring your patient, Fredrick Quinn, to the [facility] DERMATOLOGY at [facility]. Please see a copy of my visit note below.    [facility] Dermatology Note      Dermatology Problem List:  1. Rosacea : experiencing increased redness and papules/pustules  - Current treatment: azelaic acid 15% BID and oral doxycycline BID x8 weeks  2.  Eczematous dermatitis: well-controlled  - Current treatment: emollients and betamethasone 0.05%  3.  Seborrheic dermatitis with folliculitis: well-controlled  -Current treatment: ketoconazole shampoo and clindamycin 1% solution  4.  History of basal cell carcinoma, forehead, status post Mohs 08/07/2015.     Encounter Date: Sep 25, 2019    CC:  Chief Complaint   Patient presents with     Derm Problem     Fredrick is here for a skin check, states concerns with his rosacea.       History of Present Illness:  Mr. Fredrick Quinn is a 79 year old male with a history of NMSC skin cancer who presents for yearly full body skin check and follow-up of rosacea and eczematous dermatitis. He was last seen on 8/13/2018 when his eczema and rosacea were under good control.     Today he reports that his rosacea is worse. He used to experience redness only with certain triggers such as hot coffee or wine, but is now experiencing redness almost daily. He is also experiencing small, itchy bumps in his beard that are bothersome. He is currently using azelaic acid 15% twice daily to the face. He is also experiencing increased \"follicular infections\" and redness of his scalp. He uses clindamycin 1% and ketoconazole shampoo almost daily. When it flares, he will use azelaic acid on his scalp, approximately once a month. His eczema is currently well controlled with betamethasone which he uses daily when he has flare. His back is the most affected " and currently has an affected area on his lower back. He is not using a daily moisturizer now, but plans to start Vanicream daily in the winter. He is otherwise doing well and has no changes to his medical history or medications since he was last seen. He has no other concerns today.     Past Medical History:   Patient Active Problem List   Diagnosis     Acne     Basal cell carcinoma, forehead s/p mohs 8-7-15     Past Medical History:   Diagnosis Date     Basal cell carcinoma      No past surgical history on file.    Social History:  Patient reports that he has never smoked. He has never used smokeless tobacco.    Family History:  Family History   Problem Relation Age of Onset     Cancer No family hx of         no skin cancer     Skin Cancer No family hx of      Melanoma No family hx of        Medications:  Current Outpatient Medications   Medication Sig Dispense Refill     aspirin 325 MG tablet Take 1 tablet by mouth daily       atorvastatin (LIPITOR) 80 MG tablet Take 80 mg by mouth daily       azelaic acid (AZELEX) 20 % external cream Apply to face daily. 50 g 11     azelaic acid (FINACEA) 15 % external gel Apply to face daily 50 g 11     Azelaic Acid 15 % gel Use daily (Patient not taking: Reported on 8/13/2018) 50 g 11     betamethasone dipropionate (DIPROSONE) 0.05 % cream Apply 0.5 inches topically 2 times daily. 180 g 4     clindamycin (CLEOCIN T) 1 % solution To the scalp as needed for pustules 60 mL 6     Desoximetasone 0.05 % CREA Use daily to rash (Patient not taking: Reported on 8/13/2018) 60 g 12     Flaxseed, Linseed, (FLAXSEED OIL PO) Take by mouth daily       furosemide (LASIX) 20 MG tablet Take 20 mg by mouth daily       ketoconazole (NIZORAL) 2 % shampoo Use daily to scalp 120 mL 12     losartan (COZAAR) 25 MG tablet Take 25 mg by mouth daily       metoprolol (LOPRESSOR) 25 MG tablet Take 25 mg by mouth       metroNIDAZOLE (METROCREAM) 0.75 % cream Apply topically 2 times daily (Patient not  taking: Reported on 8/13/2018) 45 g 11     mometasone (ELOCON) 0.1 % solution (lotion) Apply topically twice daily as needed to scalp. Do not use on face. (Patient not taking: Reported on 8/13/2018) 60 mL 3     Multiple Vitamins-Minerals (MULTIVITAL PO) Take 1 tablet by mouth daily       omeprazole (PRILOSEC) 40 MG capsule Take 40 mg by mouth daily       potassium chloride SA (K-DUR,KLOR-CON M) 10 MEQ tablet Take by mouth 2 times daily       Allergies   Allergen Reactions     Contrast Dye Anaphylaxis     Pantoprazole Nausea     Diatrizoate Swelling     FLUSHING     Iohexol      Propranolol      Shellfish Allergy      Spironolactone Muscle Pain (Myalgia)     myalgia         Review of Systems:  -Skin Establ Pt: The patient denies any new rash, pruritus, or lesions that are symptomatic, changing or bleeding, except as per HPI.  -Constitutional: Otherwise feeling well today, in usual state of health.  -HEENT: Patient denies nonhealing oral sores.  -Skin: As above in HPI. No additional skin concerns.    Physical exam:  Vitals: There were no vitals taken for this visit.  GEN: This is a well developed, well-nourished male in no acute distress, in a pleasant mood.    SKIN: Total skin excluding the undergarment areas was performed. The exam included the head/face, neck, both arms, chest, back, abdomen, both legs, digits and/or nails.   - Vivar skin type: II  - Hyperpigmented to slightly erythematous patch on lower back without overlying scale or excoriation.  - Scalp without erythema, scale, papules or pustules .  - Mild erythema of the forehead, nose, and cheeks with rare telangiectasias. No papules or pustules today.  - Mild erythema of the chin with overlying scale, within the beard. No papules or pustules today.  - No other lesions of concern on areas examined.     Impression/Plan:  1. Rosacea: Experiencing increased redness and papules despite twice daily azelaic acid. Discussed the risks and benefits of  alternative regimens including a sulfa-based lotion, topical metronidazole, topical ivermectin, or an 8 week course of oral antibiotics. Patient elected to try a course of doxycycline.     Start doxycycline 100 mg, take twice daily for 8 weeks.    Continue azelaic acid 15%, apply to face twice daily    2. Eczematous dermatitis: Currently well controlled.    Continue betamethasone 0.05%, apply to affected areas twice daily    Start a thick emollient, such as Vanicream cream, daily in the winter    3. Seborrheic dermatitis with folliculitis: Currently well controlled    Continue ketoconazole 2% shampoo, apply daily to scalp    Continue clindamycin 1% solution, apply to scalp daily as needed for pustules    4. History of nonmelanoma skin cancer, no clincial evidence of recurrence    Sun precaution was advised including the use of sun screens of SPF 30 or higher, sun protective clothing, and avoidance of tanning beds.    Follow-up in 1 year, earlier for new or changing lesions.     Staff Involved:  IBrittanie, MS4, saw and examined the patient in the presence of Dr. Smith.       I was present with the medical student who participated in the service and in the documentation of the note. I have verified the history and personally performed the physical exam and medical decision making. I agree with the assessment and plan of care as documented in the note.  Coleen Smith MD

## 2019-09-25 NOTE — PATIENT INSTRUCTIONS
1. Start doxycycline twice daily for 8 weeks. Begin course after recovered from cath procedure.   2. Continue azelaic acid, apply to face twice daily.  3. Continue betamethasone, apply to affected areas twice daily.  4. Continue clindamycin and ketoconazole shampoo when showering.

## 2019-09-25 NOTE — NURSING NOTE
Dermatology Rooming Note    Fredrick Quinn's goals for this visit include:   Chief Complaint   Patient presents with     Derm Problem     Fredrick is here for a skin check, states concerns with his rosacea.       Sabrina Leyva LPN

## 2019-10-04 ENCOUNTER — HEALTH MAINTENANCE LETTER (OUTPATIENT)
Age: 79
End: 2019-10-04

## 2020-01-08 DIAGNOSIS — L73.9 FOLLICULITIS: ICD-10-CM

## 2020-01-09 RX ORDER — CLINDAMYCIN PHOSPHATE 11.9 MG/ML
SOLUTION TOPICAL
Qty: 60 ML | Refills: 6 | Status: SHIPPED | OUTPATIENT
Start: 2020-01-09 | End: 2021-03-04

## 2020-02-08 ENCOUNTER — HEALTH MAINTENANCE LETTER (OUTPATIENT)
Age: 80
End: 2020-02-08

## 2020-08-12 ENCOUNTER — MYC REFILL (OUTPATIENT)
Dept: DERMATOLOGY | Facility: CLINIC | Age: 80
End: 2020-08-12

## 2020-08-12 DIAGNOSIS — L21.9 DERMATITIS, SEBORRHEIC: ICD-10-CM

## 2020-08-13 RX ORDER — KETOCONAZOLE 20 MG/ML
SHAMPOO TOPICAL
Qty: 120 ML | Refills: 0 | Status: SHIPPED | OUTPATIENT
Start: 2020-08-13 | End: 2020-09-20

## 2020-08-26 ENCOUNTER — AMBULATORY - HEALTHEAST (OUTPATIENT)
Dept: CARDIAC REHAB | Facility: CLINIC | Age: 80
End: 2020-08-26

## 2020-08-26 DIAGNOSIS — Z95.818 S/P MITRAL VALVE CLIP IMPLANTATION: ICD-10-CM

## 2020-08-26 DIAGNOSIS — Z98.890 S/P MITRAL VALVE CLIP IMPLANTATION: ICD-10-CM

## 2020-09-08 ENCOUNTER — AMBULATORY - HEALTHEAST (OUTPATIENT)
Dept: CARDIAC REHAB | Facility: CLINIC | Age: 80
End: 2020-09-08

## 2020-09-08 DIAGNOSIS — Z95.818 S/P MITRAL VALVE CLIP IMPLANTATION: ICD-10-CM

## 2020-09-08 DIAGNOSIS — Z98.890 S/P MITRAL VALVE CLIP IMPLANTATION: ICD-10-CM

## 2020-09-11 ENCOUNTER — AMBULATORY - HEALTHEAST (OUTPATIENT)
Dept: CARDIAC REHAB | Facility: CLINIC | Age: 80
End: 2020-09-11

## 2020-09-11 DIAGNOSIS — Z95.818 S/P MITRAL VALVE CLIP IMPLANTATION: ICD-10-CM

## 2020-09-11 DIAGNOSIS — Z98.890 S/P MITRAL VALVE CLIP IMPLANTATION: ICD-10-CM

## 2020-09-14 ENCOUNTER — AMBULATORY - HEALTHEAST (OUTPATIENT)
Dept: CARDIAC REHAB | Facility: CLINIC | Age: 80
End: 2020-09-14

## 2020-09-14 DIAGNOSIS — Z98.890 S/P MITRAL VALVE CLIP IMPLANTATION: ICD-10-CM

## 2020-09-14 DIAGNOSIS — Z95.818 S/P MITRAL VALVE CLIP IMPLANTATION: ICD-10-CM

## 2020-09-16 DIAGNOSIS — L21.9 DERMATITIS, SEBORRHEIC: ICD-10-CM

## 2020-09-20 RX ORDER — KETOCONAZOLE 20 MG/ML
SHAMPOO TOPICAL
Qty: 100 ML | Refills: 0 | Status: SHIPPED | OUTPATIENT
Start: 2020-09-20 | End: 2020-11-18

## 2020-09-20 NOTE — TELEPHONE ENCOUNTER
"   ketoconazole (NIZORAL) 2 % external shampoo  Last Written Prescription Date:  8/13/20  Last Fill Quantity: 120ml,   # refills: 0  Last Office Visit : 9/25/19  Future Office visit: none    \" Follow-up in 1 year, earlier for new or changing lesions. \"     Scheduling has been notified to contact the pt for appointment.    Process 1    Very high, medium med alert  Warnings Override History for ketoconazole (NIZORAL) 2 % external shampoo [[id number]]     Overridden by Maeve Greer RN on Aug 13, 2020 3:41 PM    Drug-Drug    1. IMIDAZOLES / STATINS [Level: Major] [Reason: Tolerated medication/side effects in past]    Other Orders:  atorvastatin (LIPITOR) 80 MG tablet       2. KETOCONAZOLE / PROTON PUMP INHIBITORS [Level: Moderate] [Reason: Tolerated medication/side effects in past]    Other Orders:  omeprazole (PRILOSEC) 40 MG capsule              "

## 2020-09-21 ENCOUNTER — AMBULATORY - HEALTHEAST (OUTPATIENT)
Dept: CARDIAC REHAB | Facility: CLINIC | Age: 80
End: 2020-09-21

## 2020-09-21 ENCOUNTER — TELEPHONE (OUTPATIENT)
Dept: DERMATOLOGY | Facility: CLINIC | Age: 80
End: 2020-09-21

## 2020-09-21 DIAGNOSIS — Z98.890 S/P MITRAL VALVE CLIP IMPLANTATION: ICD-10-CM

## 2020-09-21 DIAGNOSIS — Z95.818 S/P MITRAL VALVE CLIP IMPLANTATION: ICD-10-CM

## 2020-09-21 NOTE — TELEPHONE ENCOUNTER
----- Message from Violet Mcdonald RN sent at 9/20/2020  8:19 AM CDT -----  Pt  RODERICK MOYA [[id number]]    Please call to schedule.  Coleen Acevedo MD  due for annual appt.      Thanks    I do not need any follow up on the scheduling of this appointment unless the patient will no longer be receiving care in our clinic.

## 2020-09-25 ENCOUNTER — AMBULATORY - HEALTHEAST (OUTPATIENT)
Dept: CARDIAC REHAB | Facility: CLINIC | Age: 80
End: 2020-09-25

## 2020-09-25 DIAGNOSIS — Z95.818 S/P MITRAL VALVE CLIP IMPLANTATION: ICD-10-CM

## 2020-09-25 DIAGNOSIS — Z98.890 S/P MITRAL VALVE CLIP IMPLANTATION: ICD-10-CM

## 2020-09-28 ENCOUNTER — AMBULATORY - HEALTHEAST (OUTPATIENT)
Dept: CARDIAC REHAB | Facility: CLINIC | Age: 80
End: 2020-09-28

## 2020-09-28 DIAGNOSIS — Z98.890 S/P MITRAL VALVE CLIP IMPLANTATION: ICD-10-CM

## 2020-09-28 DIAGNOSIS — Z95.818 S/P MITRAL VALVE CLIP IMPLANTATION: ICD-10-CM

## 2020-10-02 ENCOUNTER — AMBULATORY - HEALTHEAST (OUTPATIENT)
Dept: CARDIAC REHAB | Facility: CLINIC | Age: 80
End: 2020-10-02

## 2020-10-02 DIAGNOSIS — Z95.818 S/P MITRAL VALVE CLIP IMPLANTATION: ICD-10-CM

## 2020-10-02 DIAGNOSIS — Z98.890 S/P MITRAL VALVE CLIP IMPLANTATION: ICD-10-CM

## 2020-10-06 DIAGNOSIS — L71.9 ACNE ROSACEA: ICD-10-CM

## 2020-10-06 RX ORDER — AZELAIC ACID 0.15 G/G
GEL TOPICAL
Qty: 50 G | Refills: 11 | Status: SHIPPED | OUTPATIENT
Start: 2020-10-06 | End: 2022-05-31

## 2020-11-13 ENCOUNTER — AMBULATORY - HEALTHEAST (OUTPATIENT)
Dept: CARDIAC REHAB | Facility: CLINIC | Age: 80
End: 2020-11-13

## 2020-11-13 DIAGNOSIS — Z98.890 S/P MITRAL VALVE CLIP IMPLANTATION: ICD-10-CM

## 2020-11-13 DIAGNOSIS — Z95.818 S/P MITRAL VALVE CLIP IMPLANTATION: ICD-10-CM

## 2020-11-17 ENCOUNTER — AMBULATORY - HEALTHEAST (OUTPATIENT)
Dept: CARDIAC REHAB | Facility: CLINIC | Age: 80
End: 2020-11-17

## 2020-11-17 DIAGNOSIS — Z95.818 S/P MITRAL VALVE CLIP IMPLANTATION: ICD-10-CM

## 2020-11-17 DIAGNOSIS — Z98.890 S/P MITRAL VALVE CLIP IMPLANTATION: ICD-10-CM

## 2020-11-18 ENCOUNTER — OFFICE VISIT (OUTPATIENT)
Dept: DERMATOLOGY | Facility: CLINIC | Age: 80
End: 2020-11-18
Payer: MEDICARE

## 2020-11-18 DIAGNOSIS — L21.9 DERMATITIS, SEBORRHEIC: ICD-10-CM

## 2020-11-18 DIAGNOSIS — L30.9 DERMATITIS: Primary | ICD-10-CM

## 2020-11-18 DIAGNOSIS — Z85.828 HISTORY OF SKIN CANCER: ICD-10-CM

## 2020-11-18 PROCEDURE — 99213 OFFICE O/P EST LOW 20 MIN: CPT | Performed by: DERMATOLOGY

## 2020-11-18 RX ORDER — KETOCONAZOLE 20 MG/ML
SHAMPOO TOPICAL
Qty: 100 ML | Refills: 11 | Status: SHIPPED | OUTPATIENT
Start: 2020-11-18 | End: 2021-07-13

## 2020-11-18 ASSESSMENT — PAIN SCALES - GENERAL: PAINLEVEL: NO PAIN (0)

## 2020-11-18 NOTE — PROGRESS NOTES
[facility] Dermatology Note      Dermatology Problem List:  1. Rosacea: experiencing increased redness and papules/pustules  - Current treatment: azelaic acid 15% BID and oral doxycycline BID x8 weeks  2.  Eczematous dermatitis: well-controlled  - Current treatment: emollients and betamethasone 0.05%  3.  Seborrheic dermatitis with folliculitis: well-controlled  -Current treatment: ketoconazole shampoo and clindamycin 1% solution  4.  History of basal cell carcinoma, forehead, status post Mohs 08/07/2015.        CC:   Chief Complaint   Patient presents with     Derm Problem     Fredrick is here for his skin check, states his rosacea is under control.          Encounter Date: Nov 18, 2020    History of Present Illness:  Mr. Fredrick Quinn is a 80 year old male who with a history of skin cancer presents for a skin check.  No tender or bleeding lesions.  His seb derm, rosacea and dermatitis are currently well controlled with his topical regimen.  He is noticing easy bruising and bleeding with skin tears related to his blood thinner.    Past Medical History:   Patient Active Problem List   Diagnosis     Acne     Basal cell carcinoma, forehead s/p mohs 8-7-15     Past Medical History:   Diagnosis Date     Basal cell carcinoma      No past surgical history on file.    Social History:  Patient reports that he has never smoked. He has never used smokeless tobacco.    Family History:  Family History   Problem Relation Age of Onset     Cancer No family hx of         no skin cancer     Skin Cancer No family hx of      Melanoma No family hx of        Medications:  Current Outpatient Medications   Medication Sig Dispense Refill     apixaban ANTICOAGULANT (ELIQUIS) 5 MG tablet Take 5 mg by mouth       aspirin 325 MG tablet Take 1 tablet by mouth daily       atorvastatin (LIPITOR) 80 MG tablet Take 80 mg by mouth daily       azelaic acid (FINACEA) 15 % external gel Apply to face daily 50 g 11     betamethasone  dipropionate (DIPROSONE) 0.05 % external cream Apply topically 2 times daily. 180 g 4     clindamycin (CLEOCIN T) 1 % external solution To the scalp as needed for pustules 60 mL 6     Flaxseed, Linseed, (FLAXSEED OIL PO) Take by mouth daily       furosemide (LASIX) 20 MG tablet Take 20 mg by mouth daily       ketoconazole (NIZORAL) 2 % external shampoo USE DAILY TO SCALP. FOR ADDITIONAL REFILLS, PLEASE SCHEDULE A FOLLOW-UP APPOINTMENT WITH DR RIOS -[phone] mL 0     losartan (COZAAR) 25 MG tablet Take 25 mg by mouth daily       Multiple Vitamins-Minerals (MULTIVITAL PO) Take 1 tablet by mouth daily       omeprazole (PRILOSEC) 40 MG capsule Take 40 mg by mouth daily       potassium chloride SA (K-DUR,KLOR-CON M) 10 MEQ tablet Take by mouth 2 times daily       amiodarone (PACERONE/CODARONE) 200 MG tablet Take 100 mg by mouth       azelaic acid (AZELEX) 20 % external cream Apply to face daily. 50 g 11     Azelaic Acid 15 % gel Use daily (Patient not taking: Reported on 8/13/2018) 50 g 11     Desoximetasone 0.05 % CREA Use daily to rash (Patient not taking: Reported on 8/13/2018) 60 g 12     metoprolol (LOPRESSOR) 25 MG tablet Take 25 mg by mouth       metroNIDAZOLE (METROCREAM) 0.75 % cream Apply topically 2 times daily (Patient not taking: Reported on 8/13/2018) 45 g 11     mometasone (ELOCON) 0.1 % solution (lotion) Apply topically twice daily as needed to scalp. Do not use on face. (Patient not taking: Reported on 8/13/2018) 60 mL 3     Allergies   Allergen Reactions     Contrast Dye Anaphylaxis     Pantoprazole Nausea     Diatrizoate Swelling     FLUSHING     Iohexol      Propranolol      Shellfish Allergy      Spironolactone Muscle Pain (Myalgia)     myalgia         .    Physical exam:  Vitals: There were no vitals taken for this visit.  GEN: This is a well developed, well-nourished male in no acute distress, in a pleasant mood.    SKIN: Total skin excluding the undergarment areas was performed. The  exam included the head/face, neck, both arms, chest, back, abdomen, both legs, digits and/or nails.   -Vivar skin type: I  -There is no erythema, telangectasias, nodularity, or pigmentation on the previous skin cancer site.  -There are pink scaly patches and plaques on the back at the site of EKG leads.  -large bruise of right foot  -No other lesions of concern on areas examined.     Labs:  NA    Impression/Plan:  1. History of nonmelanoma skin cancer, no clincial evidence of recurrence  - We will clinically monitor this area.  -    2. Rosacea  - well controlled   - continue current treatment in DPL    3. Eczematous dermatitis  - well controlled   - continue current treatment in [facility]    4. Seborrheic dermatitis  - well controlled  - continue current treatment in [facility]    CC ESTABLISHED PATIENT  No address on file on close of this encounter.  Follow-up in 1 year, earlier for new or changing lesions.       Staff Involved:  Staff Only

## 2020-11-18 NOTE — NURSING NOTE
Dermatology Rooming Note    Fredrick Quinn's goals for this visit include:   Chief Complaint   Patient presents with     Derm Problem     Fredrick is here for his skin check, states his rosacea is under control.          Sabrina Leyva LPN

## 2020-11-18 NOTE — LETTER
11/18/2020       RE: Fredrick Quinn  [address] [address]     Dear Colleague,    Thank you for referring your patient, Fredrick Quinn, to the [facility] DERMATOLOGY CLINIC [place] at [facility]. Please see a copy of my visit note below.    [facility] Dermatology Note      Dermatology Problem List:  1. Rosacea: experiencing increased redness and papules/pustules  - Current treatment: azelaic acid 15% BID and oral doxycycline BID x8 weeks  2.  Eczematous dermatitis: well-controlled  - Current treatment: emollients and betamethasone 0.05%  3.  Seborrheic dermatitis with folliculitis: well-controlled  -Current treatment: ketoconazole shampoo and clindamycin 1% solution  4.  History of basal cell carcinoma, forehead, status post Mohs 08/07/2015.        CC:   Chief Complaint   Patient presents with     Derm Problem     Fredrick is here for his skin check, states his rosacea is under control.          Encounter Date: Nov 18, 2020    History of Present Illness:  Mr. Fredrick Quinn is a 80 year old male who with a history of skin cancer presents for a skin check.  No tender or bleeding lesions.  His seb derm, rosacea and dermatitis are currently well controlled with his topical regimen.  He is noticing easy bruising and bleeding with skin tears related to his blood thinner.    Past Medical History:   Patient Active Problem List   Diagnosis     Acne     Basal cell carcinoma, forehead s/p mohs 8-7-15     Past Medical History:   Diagnosis Date     Basal cell carcinoma      No past surgical history on file.    Social History:  Patient reports that he has never smoked. He has never used smokeless tobacco.    Family History:  Family History   Problem Relation Age of Onset     Cancer No family hx of         no skin cancer     Skin Cancer No family hx of      Melanoma No family hx of        Medications:  Current Outpatient Medications    Medication Sig Dispense Refill     apixaban ANTICOAGULANT (ELIQUIS) 5 MG tablet Take 5 mg by mouth       aspirin 325 MG tablet Take 1 tablet by mouth daily       atorvastatin (LIPITOR) 80 MG tablet Take 80 mg by mouth daily       azelaic acid (FINACEA) 15 % external gel Apply to face daily 50 g 11     betamethasone dipropionate (DIPROSONE) 0.05 % external cream Apply topically 2 times daily. 180 g 4     clindamycin (CLEOCIN T) 1 % external solution To the scalp as needed for pustules 60 mL 6     Flaxseed, Linseed, (FLAXSEED OIL PO) Take by mouth daily       furosemide (LASIX) 20 MG tablet Take 20 mg by mouth daily       ketoconazole (NIZORAL) 2 % external shampoo USE DAILY TO SCALP. FOR ADDITIONAL REFILLS, PLEASE SCHEDULE A FOLLOW-UP APPOINTMENT WITH DR RIOS -[phone] mL 0     losartan (COZAAR) 25 MG tablet Take 25 mg by mouth daily       Multiple Vitamins-Minerals (MULTIVITAL PO) Take 1 tablet by mouth daily       omeprazole (PRILOSEC) 40 MG capsule Take 40 mg by mouth daily       potassium chloride SA (K-DUR,KLOR-CON M) 10 MEQ tablet Take by mouth 2 times daily       amiodarone (PACERONE/CODARONE) 200 MG tablet Take 100 mg by mouth       azelaic acid (AZELEX) 20 % external cream Apply to face daily. 50 g 11     Azelaic Acid 15 % gel Use daily (Patient not taking: Reported on 8/13/2018) 50 g 11     Desoximetasone 0.05 % CREA Use daily to rash (Patient not taking: Reported on 8/13/2018) 60 g 12     metoprolol (LOPRESSOR) 25 MG tablet Take 25 mg by mouth       metroNIDAZOLE (METROCREAM) 0.75 % cream Apply topically 2 times daily (Patient not taking: Reported on 8/13/2018) 45 g 11     mometasone (ELOCON) 0.1 % solution (lotion) Apply topically twice daily as needed to scalp. Do not use on face. (Patient not taking: Reported on 8/13/2018) 60 mL 3     Allergies   Allergen Reactions     Contrast Dye Anaphylaxis     Pantoprazole Nausea     Diatrizoate Swelling     FLUSHING     Iohexol      Propranolol       Shellfish Allergy      Spironolactone Muscle Pain (Myalgia)     myalgia         .    Physical exam:  Vitals: There were no vitals taken for this visit.  GEN: This is a well developed, well-nourished male in no acute distress, in a pleasant mood.    SKIN: Total skin excluding the undergarment areas was performed. The exam included the head/face, neck, both arms, chest, back, abdomen, both legs, digits and/or nails.   -Vivar skin type: I  -There is no erythema, telangectasias, nodularity, or pigmentation on the previous skin cancer site.  -There are pink scaly patches and plaques on the back at the site of EKG leads.  -large bruise of right foot  -No other lesions of concern on areas examined.     Labs:  NA    Impression/Plan:  1. History of nonmelanoma skin cancer, no clincial evidence of recurrence  - We will clinically monitor this area.  -    2. Rosacea  - well controlled   - continue current treatment in [facility]    3. Eczematous dermatitis  - well controlled   - continue current treatment in [facility]    4. Seborrheic dermatitis  - well controlled  - continue current treatment in [facility]    CC ESTABLISHED PATIENT  No address on file on close of this encounter.  Follow-up in 1 year, earlier for new or changing lesions.       Staff Involved:  Staff Only        Coleen Smith MD

## 2020-11-19 ENCOUNTER — AMBULATORY - HEALTHEAST (OUTPATIENT)
Dept: CARDIAC REHAB | Facility: CLINIC | Age: 80
End: 2020-11-19

## 2020-11-19 DIAGNOSIS — Z98.890 S/P MITRAL VALVE CLIP IMPLANTATION: ICD-10-CM

## 2020-11-19 DIAGNOSIS — Z95.818 S/P MITRAL VALVE CLIP IMPLANTATION: ICD-10-CM

## 2020-11-24 ENCOUNTER — AMBULATORY - HEALTHEAST (OUTPATIENT)
Dept: CARDIAC REHAB | Facility: CLINIC | Age: 80
End: 2020-11-24

## 2020-11-24 DIAGNOSIS — Z95.818 S/P MITRAL VALVE CLIP IMPLANTATION: ICD-10-CM

## 2020-11-24 DIAGNOSIS — Z98.890 S/P MITRAL VALVE CLIP IMPLANTATION: ICD-10-CM

## 2020-12-01 ENCOUNTER — AMBULATORY - HEALTHEAST (OUTPATIENT)
Dept: CARDIAC REHAB | Facility: CLINIC | Age: 80
End: 2020-12-01

## 2020-12-01 DIAGNOSIS — Z95.818 S/P MITRAL VALVE CLIP IMPLANTATION: ICD-10-CM

## 2020-12-01 DIAGNOSIS — Z98.890 S/P MITRAL VALVE CLIP IMPLANTATION: ICD-10-CM

## 2020-12-03 ENCOUNTER — AMBULATORY - HEALTHEAST (OUTPATIENT)
Dept: CARDIAC REHAB | Facility: CLINIC | Age: 80
End: 2020-12-03

## 2020-12-03 DIAGNOSIS — Z98.890 S/P MITRAL VALVE CLIP IMPLANTATION: ICD-10-CM

## 2020-12-03 DIAGNOSIS — Z95.818 S/P MITRAL VALVE CLIP IMPLANTATION: ICD-10-CM

## 2020-12-08 ENCOUNTER — AMBULATORY - HEALTHEAST (OUTPATIENT)
Dept: CARDIAC REHAB | Facility: CLINIC | Age: 80
End: 2020-12-08

## 2020-12-08 DIAGNOSIS — Z95.818 S/P MITRAL VALVE CLIP IMPLANTATION: ICD-10-CM

## 2020-12-08 DIAGNOSIS — Z98.890 S/P MITRAL VALVE CLIP IMPLANTATION: ICD-10-CM

## 2020-12-10 ENCOUNTER — AMBULATORY - HEALTHEAST (OUTPATIENT)
Dept: CARDIAC REHAB | Facility: CLINIC | Age: 80
End: 2020-12-10

## 2020-12-10 DIAGNOSIS — Z98.890 S/P MITRAL VALVE CLIP IMPLANTATION: ICD-10-CM

## 2020-12-10 DIAGNOSIS — Z95.818 S/P MITRAL VALVE CLIP IMPLANTATION: ICD-10-CM

## 2021-03-04 DIAGNOSIS — L73.9 FOLLICULITIS: ICD-10-CM

## 2021-03-04 RX ORDER — CLINDAMYCIN PHOSPHATE 11.9 MG/ML
SOLUTION TOPICAL
Qty: 60 ML | Refills: 6 | Status: SHIPPED | OUTPATIENT
Start: 2021-03-04 | End: 2021-07-13

## 2021-03-27 ENCOUNTER — HEALTH MAINTENANCE LETTER (OUTPATIENT)
Age: 81
End: 2021-03-27

## 2021-05-24 ENCOUNTER — RECORDS - HEALTHEAST (OUTPATIENT)
Dept: ADMINISTRATIVE | Facility: CLINIC | Age: 81
End: 2021-05-24

## 2021-06-05 VITALS — WEIGHT: 166.3 LBS | BODY MASS INDEX: 26.05 KG/M2

## 2021-06-05 VITALS — BODY MASS INDEX: 26.39 KG/M2 | WEIGHT: 168.5 LBS

## 2021-06-05 VITALS — WEIGHT: 164.6 LBS | BODY MASS INDEX: 24.31 KG/M2

## 2021-06-05 VITALS — BODY MASS INDEX: 24.31 KG/M2 | WEIGHT: 164.6 LBS

## 2021-06-05 VITALS — WEIGHT: 164.7 LBS | BODY MASS INDEX: 24.32 KG/M2

## 2021-06-05 VITALS — WEIGHT: 167.2 LBS | BODY MASS INDEX: 26.19 KG/M2

## 2021-06-05 VITALS — WEIGHT: 165 LBS | BODY MASS INDEX: 24.37 KG/M2

## 2021-06-05 VITALS — WEIGHT: 165.8 LBS | BODY MASS INDEX: 25.97 KG/M2

## 2021-06-05 VITALS — WEIGHT: 165.1 LBS | BODY MASS INDEX: 24.38 KG/M2

## 2021-06-05 VITALS — BODY MASS INDEX: 24.66 KG/M2 | WEIGHT: 167 LBS

## 2021-06-05 VITALS — BODY MASS INDEX: 26.08 KG/M2 | WEIGHT: 166.5 LBS

## 2021-06-05 VITALS — WEIGHT: 168.5 LBS | BODY MASS INDEX: 26.39 KG/M2

## 2021-06-05 VITALS — WEIGHT: 167.5 LBS | BODY MASS INDEX: 26.23 KG/M2

## 2021-06-05 VITALS — WEIGHT: 168 LBS | BODY MASS INDEX: 24.81 KG/M2

## 2021-06-11 NOTE — PROGRESS NOTES
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 In the past 12 months, have you been in a relationship where you have been abused physically, emotionally, sexually or financially? No  notified: NA  2. Who do you turn to for emotional support?: spouse, children  3. Do you have cultural or spiritual needs? No  4. Have there been any major life changes in the past 12 months? No    Referral Information  Primary Physician: Sahil Salvador MD  Cardiologist: Dr. Perez  Surgeon: nalini    Home exercise/Equipment: treadmill    Patient's long-term goal(s): resume previous exercise routine on treadmll    1. Living Accommodations: Home Steps: Yes      Support people at home: spouse   2. Marital Status:   3. Family is able to assist with cares      Hoahaoism/Community involvement: na  4. Recreation/Hobbies: being outdoors, photography

## 2021-06-11 NOTE — PROGRESS NOTES
ITP ASSESSMENT   Assessment Day: Initial    Session Number: 1  Precautions: post Mitraclip, access through groin    Diagnosis: Valve    Risk Stratification: High    Referring Provider: Dr. Huffman  ITP:Dr. Diaz  EXERCISE  Exercise Assessment: Initial       6 Minute Walk Test   Pre   Pre Exercise HR: 60                    Pre Exercise BP: 100/62      Peak  Peak HR: 81                   Peak BP: 124/66    Peak feet: 1700    Peak O2 SAT: 97    Peak RPE: 4    Peak MPH: 3.22      Symptoms:  Peak Symptoms: none      5 mins. Post  5 Min Post HR: 61    5 Min Post BP: 112/74                           Exercise Plan  Goals Next 30 days  ADL'S: LTG:Pt will tolerate exercise at 4-4.5 MET level to mimic home activities including mopping bathroom, vacuuming and resume previous exercise level on treadmill.       Education Goals: All goals in this section met    Education Goals Met: Patient can state cardiac s/s and appropriate emergency response.;Has system for taking medication.;Medication review.      Exercise Prescription  Exercise Mode: Treadmill;Bike;Nustep;Arm Erg.    Frequency: 2x/week    Duration: 40 min    Intensity / THR: 20-30 beats above resting heart rate    RPE 11-14  Progression / Met level: 3.5-4.5    Resistive Training?: Yes      Current Exercise (mins/week): 300      Interventions  Home Exercise:  Mode: treadmill    Frequency: 3-4x/week    Duration: 40-60 min      Education Material : Individual education and counseling      Education Completed  Exercise Education Completed: Cardiac Anatomy;Signs and Symptoms;Medication review;RPE;Emergency Plan;Home Exercise;Warm up/cool down;FITT Principles;Benefits of Exercise;End point of exercise              Exercise Follow-up/Discharge  Follow up/Discharge: Skilled therapy needed to monitor pt's heart response post Mitraclip,  to safely resume previous exercise level on treadmill and home activities including mopping and vacuuming.   NUTRITION  Nutrition Assessment:  "Initial      Nutrition Risk Factors:  Nutrition Risk Factors: Dyslipidemia  Cholesterol: 153  LDL: 91  HDL: 46  Triglycerides: 82      Nutrition Plan  Interventions  Other Nutrition Intervention: Therapist/Pt Discussion        Education Completed  Nutrition Education Completed: Low Saturated fat diet;Risk factor overview;Low sodium diet      Goals  Nutrition Goals (Next 30 days): Patient can identify their risk factors for CAD      Goals Met  Nutrition Goals Met: Completed Nutritional Risk Screen;Provided Rate your Plate Survey;Patient states following a low saturated fat diet;Patient follows a low sodium diet      Height, Weight, and  BMI  Weight: 168 lb (76.2 kg)  Height: 5' 7\" (1.702 m)  BMI: 26.31      Nutrition Follow-up  Follow-up/Discharge: Pt and wife may be interested in meeting with dietician         Other Risk Factors  Other Risk Factor Assessment: Initial      HTN Risk Factor: Hypertension      Pre Exercise BP: 100/62  Post Exercise BP: 112/74      Hypertension Plan  Goals  HTN Goals: Patient demonstrates understanding of HTN, no goals identified for the next 30 days      Goals Met  HTN Goals Met: Follow low sodium diet;Take medication as prescribed;Exercises regularly      HTN Interventions  HTN Interventions: Diet consult;Therapist/patient discussion      HTN Education Completed  HTN Education Completed: Low sodium diet;Medication review;Risk factor overview      Tobacco Risk Factor: NA          Risk Factor Follow-up   Follow-up/Discharge: Pt quit smoking in 1975     PSYCHOSOCIAL  Psychosocial Assessment: Initial       [place] COOP Q of L Summary Score: 14      PHQ-9 Total Score: 4      Psychosocial Risk Factor: Stress      Psychosocial Plan  Interventions  Interventions: Individual education and counseling       Education Completed  Education Completed: Relaxation/Coping Techniques      Goals  Goals (Next 30 days): Improvement in [place] COOP score      Goals Met  Goals Met: Identified Support " system;Identify stressors;Practicing stress management skills               Patient involved in Goal setting?: Yes      Signature: _____________________________________________________________    Date: __________________    Time: __________________

## 2021-06-12 NOTE — PROGRESS NOTES
"ITP ASSESSMENT   Assessment Day: 60 Day    Session Number: 7  Precautions: s/p Mitraclip    Diagnosis: Valve    Risk Stratification: High    Referring Provider: Sahil Salvador MD   ITP sent to Dr. Diaz  EXERCISE  Exercise Assessment: Reassessment                         Exercise Plan  Goals Next 30 days  ADL'S: Goal #1: Increase strength and core exercise. Start a regular strength training program 2x/week in CR.     Leisure: LTG is to reach a 4-4.5METs. Continue to increase METs in CR by trying other modalities.     Education Goals: All goals in this section met    Education Goals Met: Patient can state cardiac s/s and appropriate emergency response.;Has system for taking medication.;Medication review.                          Goals Met  30 day ADL'S goals met: Goal not met, will keep same goal. Goal #1: Increase strength and core exercise. Start a regular strength training program 2x/week in CR.     30 day Leisure goals met: Goal not met,will keep same goal. LTG is to reach a 4-4.5METs. Continue to increase METs in CR by trying other modalities.     30 Day Progression: Patient has reached 4.2 METs in cardiac rehab.  Patient was limited with new tachy arrythmia event on 10/2.      Initial ADL's goals met: Pt has returned to all activites (Vacuuming, mopping, TM without cv s/s) pre Montserrat Clip. He has even been doing above initial home TM goals 3.3mph no elevation. However pt continue to need to increase METs in CR 4.5 METs with current MET at 4.2 on the TM.     Initial Progression: (S) Pt has progressed to 4.2 METs on TM. Continue to reach LTG of 4-4.5METs. Pt was tolerating levels well until today pt had an \"new tachyarrythmia event\" regular wide complex with rate 135bpm which did not change with vagal maneuvers-no change in rate or rhythm likely ?A-Flutter vs SVT and due to pt feeling unstable: diaphoretic sx, lightheadedness, stomach pain and hypotension-pt further evaluated in ER.       Exercise " Prescription  Exercise Mode: Treadmill;Bike;Nustep;Arm Erg.;Stairs;Hallway Walking    Frequency: 2x/week    Duration: 30-60 minutes    Intensity / THR: 20-30 beats above resting heart rate    RPE 11-14  Progression / Met level: 4.3-5    Resistive Training?: Yes      Current Exercise (mins/week): 300      Interventions  Home Exercise:  Mode: TM    Frequency: 3-4x/week    Duration: 30-70 minutes      Education Material : Individual education and counseling;Provide written material      Education Completed  Exercise Education Completed: Cardiac Anatomy;Signs and Symptoms;Medication review;RPE;Emergency Plan;Home Exercise;Warm up/cool down;FITT Principles;Benefits of Exercise;End point of exercise              Exercise Follow-up/Discharge  Follow up/Discharge: Skilled therapy needed to continue to monitor EKG with recent emergent event with exercising on TM in CR and pt went into a tachyarrythmia ?A-Flutter vs SVT with cv s/s-pt escorted to ER for further evaluation of new arrythmia. Pt was progressing well and tolerating all activities and goals until today in CR. Continue to safely progress pt to LTG of 4-4.5METs and work on strengthing all muscle groups. Continue to educate pt on strength trainging techniques and monitor EKG and proper cv response with ex.    NUTRITION  Nutrition Assessment: Reassessment    Nutrition Risk Factors:  Nutrition Risk Factors: Dyslipidemia  Cholesterol: 153 (1/3/20)  LDL: 91  HDL: 46  Triglycerides: 82      Nutrition Plan  Interventions  Diet Consult: NA    Other Nutrition Intervention: Therapist/Pt Discussion    Initial Rate Your Plate Score: 0    Education Completed  Nutrition Education Completed: Low Saturated fat diet;Risk factor overview;Low sodium diet      Goals  Nutrition Goals (Next 30 days): Review Dietitian schedule;Provide Rate your Plate Survey;Patient can identify their risk factors for CAD      Goals Met  Nutrition Goals Met: Completed Nutritional Risk Screen;Provided Rate  "your Plate Survey;Patient states following a low saturated fat diet;Patient follows a low sodium diet;Reviewed Dietitian schedule      Height, Weight, and  BMI  Weight: 165 lb (74.8 kg)  Height: 5' 7\" (1.702 m)  BMI: 25.84      Nutrition Follow-up  Follow-up/Discharge: Will address on next ITP.         Other Risk Factors  Other Risk Factor Assessment: Reassessment      HTN Risk Factor: Hypertension      Pre Exercise BP: 110/72  Post Exercise BP: (S) 118/64      Hypertension Plan  Goals  HTN Goals: Patient demonstrates understanding of HTN, no goals identified for the next 30 days      Goals Met  HTN Goals Met: Follow low sodium diet;Take medication as prescribed;Exercises regularly      HTN Interventions  HTN Interventions: Diet consult;Therapist/patient discussion;Provide written material      HTN Education Completed  HTN Education Completed: Low sodium diet;Medication review;Risk factor overview      Tobacco Risk Factor: NA        Risk Factor Follow-up   Follow-up/Discharge: CRF: Cholesterol, HTN and Stress.  Will address on next ITP.     PSYCHOSOCIAL  Psychosocial Assessment: Reassessment       [place] COOP Q of L Summary Score: 14      PHQ-9 Total Score: 4      Psychosocial Risk Factor: Stress      Psychosocial Plan  Interventions  Interventions: Individual education and counseling      Education Completed  Education Completed: Relaxation/Coping Techniques      Goals  Goals (Next 30 days): Improvement in [place] COOP score      Goals Met  Goals Met: Identified Support system;Identify stressors;Practicing stress management skills      Psychosocial Follow-up  Follow-up/Discharge: Will address on next ITP             Patient involved in Goal setting?: No      Signature: _____________________________________________________________    Date: __________________    Time: __________________  " <<----- Click to add NO pertinent Past Medical History No pertinent past medical history

## 2021-06-12 NOTE — PROGRESS NOTES
"ITP ASSESSMENT   Assessment Day: 30 Day    Session Number: 7  Precautions: s/p Mitraclip    Diagnosis: Valve    Risk Stratification: High    Referring Provider: Dr. Huffman  EXERCISE  Exercise Assessment: Reassessment                          Exercise Plan  Goals Next 30 days  Goal #1: Increase strength and core exercise. Start a regular strength training program 2x/week in CR.     LTG is to reach a 4-4.5METs. Continue to increase METs in CR by trying other modalities.     Education Goals: All goals in this section met    Education Goals Met: Patient can state cardiac s/s and appropriate emergency response.;Has system for taking medication.;Medication review.                          Goals Met  Initial ADL's goals met: Pt has returned to all activites (Vacuuming, mopping, TM without cv s/s) pre Montserrat Clip. He has even been doing above initial home TM goals 3.3mph no elevation. However pt continue to need to increase METs in CR 4.5 METs with current MET at 4.2 on the TM.   Initial Progression: (S) Pt has progressed to 4.2 METs on TM. Continue to reach LTG of 4-4.5METs. Pt was tolerating levels well until today pt had an \"new tachyarrythmia event\" regular wide complex with rate 135bpm which did not change with vagal maneuvers-no change in rate or rhythm likely ?A-Flutter vs SVT and due to pt feeling unstable: diaphoretic sx, lightheadedness, stomach pain and hypotension-pt further evaluated in ER.       Exercise Prescription  Exercise Mode: Treadmill;Bike;Nustep;Arm Erg.;Stairs;Hallway Walking    Frequency: 2x/week    Duration: 30-60 minutes    Intensity / THR: 20-30 beats above resting heart rate    RPE 11-14  Progression / Met level: 4.2-4.5    Resistive Training?: Yes      Current Exercise (mins/week): 300      Interventions  Home Exercise:  Mode: TM    Frequency: 3-4x/week    Duration: 30-70 minutes      Education Material : Individual education and counseling;Provide written material    Education " "Completed  Exercise Education Completed: Cardiac Anatomy;Signs and Symptoms;Medication review;RPE;Emergency Plan;Home Exercise;Warm up/cool down;FITT Principles;Benefits of Exercise;End point of exercise          Exercise Follow-up/Discharge  Follow up/Discharge: Skilled therapy needed to continue to monitor EKG with recent emergent event with exercising on TM in CR and pt went into a tachyarrythmia ?A-Flutter vs SVT with cv s/s-pt escorted to ER for further evaluation of new arrythmia. Pt was progressing well and tolerating all activities and goals until today in CR. Continue to safely progress pt to LTG of 4-4.5METs and work on strengthing all muscle groups. Continue to educate pt on strength trainging techniques and monitor EKG and proper cv response with ex.    NUTRITION  Nutrition Assessment: Reassessment    Nutrition Risk Factors:  Nutrition Risk Factors: Dyslipidemia  Cholesterol: 153 (1/3/20)  LDL: 91  HDL: 46  Triglycerides: 82    Nutrition Plan  Interventions  Diet Consult: NA (pending wait list)    Other Nutrition Intervention: Therapist/Pt Discussion    Initial Rate Your Plate Score: 0 (Encouraged pt to complete-give reminders)    No data recorded    Education Completed  Nutrition Education Completed: Low Saturated fat diet;Risk factor overview;Low sodium diet      Goals  Nutrition Goals (Next 30 days): Review Dietitian schedule;Provide Rate your Plate Survey;Patient can identify their risk factors for CAD      Goals Met  Nutrition Goals Met: Completed Nutritional Risk Screen;Provided Rate your Plate Survey;Patient states following a low saturated fat diet;Patient follows a low sodium diet;Reviewed Dietitian schedule      Height, Weight, and  BMI  Weight: 165 lb (74.8 kg)  Height: 5' 7\" (1.702 m)  BMI: 25.84      Nutrition Follow-up  Follow-up/Discharge: Pt has concerns with losing weight and hopes to not gain weight but gain muscle-start regular strength training program in CR. Pt and wife may be " interested in meeting with dietician       Other Risk Factors  Other Risk Factor Assessment: Reassessment      HTN Risk Factor: Hypertension      Pre Exercise BP: 110/72  Post Exercise BP: (S) 118/64      Hypertension Plan  Goals  HTN Goals: Patient demonstrates understanding of HTN, no goals identified for the next 30 days      Goals Met  HTN Goals Met: Follow low sodium diet;Take medication as prescribed;Exercises regularly      HTN Interventions  HTN Interventions: Diet consult;Therapist/patient discussion;Provide written material      HTN Education Completed  HTN Education Completed: Low sodium diet;Medication review;Risk factor overview      Tobacco Risk Factor: NA    Risk Factor Follow-up   Follow-up/Discharge: CRF: Cholesterol, HTN and Stress         PSYCHOSOCIAL  Psychosocial Assessment: Reassessment       [place] CO[place] Q of L Summary Score: 14 (pre)      PHQ-9 Total Score: 4 (pre)      Psychosocial Risk Factor: Stress      Psychosocial Plan  Interventions  Interventions: Individual education and counseling      Education Completed  Education Completed: Relaxation/Coping Techniques      Goals  Goals (Next 30 days): Improvement in [place] COOP score      Goals Met  Goals Met: Identified Support system;Identify stressors;Practicing stress management skills      Psychosocial Follow-up  Follow-up/Discharge: Will address on next ITP           Patient involved in Goal setting?: Yes

## 2021-06-12 NOTE — PROGRESS NOTES
Summary of Event:  While pt was walking on TM after 32 minutes he had a rhythm change and when asked he c/o lightheadedness, sat patient down in chair on TM.  New regular wide complex with rate 135bpm which did not change with vagal maneuvers-no change in rate or rhythm. Likely ?A-Flutter/BBB vs SVT and due to pt feeling unstable: diaphoretic sx, lightheadedness, stomach pain and hypotension-Internal Code 9 called and pt evaluated by Dr. Healy. Transported pt via cart/lifepak and Pt further evaluated in ER.     Information called/faxed to MD/NP  Clinician Name:     Dispensation of Patient:  Sent to Emergency Room    Parameter On Arrival With Event At Departure   Time 1100 1136 1147   Heart Rate 60 135-137 135-137   Rhythm SB/SR/SA with frequent ectopy Wide Complex/BBB/?A-Flutter vs SVT Wide Complex/BBB   ? A-Flutter vs SVT   Blood Pressure 110/72 Dropped   102/60 118/64   Oxygen Sats. N/A 98 98   Other: No cv s/s Lightheaded, Diaphoretic Lightheaded, Diaphoretic and stomach pain     Follow-up of Outcome:  Review ER records. They did try Adenosine. Pt ended up convertering on own by 1pm when I checked in with the RN in ER

## 2021-06-13 NOTE — PROGRESS NOTES
ITP ASSESSMENT   Assessment Day: 90 Day    Session Number: 10  Precautions: s/p Mitraclip, Arrhythmias with PPM/ICD and recently changed settings    Diagnosis: Valve    Risk Stratification: High    Referring Provider: Dr. Huffman  EXERCISE  Exercise Assessment: Reassessment                         Exercise Plan  Goals Next 30 days  ADL'S: Goal #1: Establish a regular strength training program at home 2x/week on non rehab days    Leisure: LTG is to reach a 4-4.5METs by the end of CR program ~ January 2021. Continue to increase METs in CR by trying other modalities and utlize both continous and interval ex to safely progress pt    Education Goals: All goals in this section met    Education Goals Met: Patient can state cardiac s/s and appropriate emergency response.;Has system for taking medication.;Medication review.                          Goals Met  60 day ADL'S goals met: Goal not met:Pt did weights 1 x in CR since last update. Pt plans to now start a regular strength training program at home and he plans to do core ex at home vs CR.    60 day Leisure goals met: Goal not met:LTG is to reach a 4-4.5METs. Continue to increase METs in CR by trying other modalities.     60 Day Progression: Pt had a symptomatic/new wide complex arrhythmia on10/2/20. Pt missed CR for several sessions due to need to get better control of rhythm and pt f/u with Cardiologist and changed his PPM settings. Continue to progress pt to LTG of 4-4.5METs      30 day ADL'S goals met: Goal not met, will keep same goal. Goal #1: Increase strength and core exercise. Start a regular strength training program 2x/week in CR.     30 day Leisure goals met: Goal not met,will keep same goal. LTG is to reach a 4-4.5METs. Continue to increase METs in CR by trying other modalities.     30 Day Progression: Patient has reached 4.2 METs in cardiac rehab.  Patient was limited with new tachy arrythmia event on 10/2.      Initial ADL's goals met: Pt has returned to  "all activites (Vacuuming, mopping, TM without cv s/s) pre Montserrat Clip. He has even been doing above initial home TM goals 3.3mph no elevation. However pt continue to need to increase METs in CR 4.5 METs with current MET at 4.2 on the TM.     Initial Progression: (S) Pt has progressed to 4.2 METs on TM. Continue to reach LTG of 4-4.5METs. Pt was tolerating levels well until today pt had an \"new tachyarrythmia event\" regular wide complex with rate 135bpm which did not change with vagal maneuvers-no change in rate or rhythm likely ?A-Flutter vs SVT and due to pt feeling unstable: diaphoretic sx, lightheadedness, stomach pain and hypotension-pt further evaluated in ER.       Exercise Prescription  Exercise Mode: Treadmill;Bike;Nustep;Arm Erg.;Stairs;Hallway Walking (Continuous and interval exercise)    Frequency: 2x/week    Duration: 30-60min    Intensity / THR: 20-30 beats above resting heart rate ([place] 60-75%:108-120, MIIT 50-60%:100-108 HIIT 80%:124bpm)    RPE 11-14  Progression / Met level: 4-4.5METs     Resistive Training?: Yes      Current Exercise (mins/week): 420      Interventions  Home Exercise:  Mode: TM/Walk    Frequency: 4-6x/week     Duration: 30-70minutes      Education Material : Individual education and counseling;Provide written material      Education Completed  Exercise Education Completed: Cardiac Anatomy;Signs and Symptoms;Medication review;RPE;Emergency Plan;Home Exercise;Warm up/cool down;FITT Principles;Benefits of Exercise;End point of exercise;BP/HR Reponse to exercise;Stretching;Strength training              Exercise Follow-up/Discharge  Follow up/Discharge: Skilled therapy needed to monitor with currently new arrhythmias and new PPM settings. Monitor for proper cv response. Educate on continous and interval and safely progress pt to LTG of 4-4.5METs by end of CR.   NUTRITION  Nutrition Assessment: Reassessment    Nutrition Risk Factors:  Nutrition Risk Factors: Dyslipidemia  Cholesterol: " "153 (1/3/20)  LDL: 91  HDL: 46  Triglycerides: 82    Nutrition Plan  Interventions  Diet Consult: MARU (Dietician scheduled December 4th at 1015am)    Other Nutrition Intervention: Therapist/Pt Discussion    Initial Rate Your Plate Score: 0    Follow-Up Rate Your Plate Score: 0 (Pt given RYP and encouraged pt to complete.)      Education Completed  Nutrition Education Completed: Low Saturated fat diet;Risk factor overview;Low sodium diet      Goals  Nutrition Goals (Next 30 days): Review Dietitian schedule;Provide Rate your Plate Survey;Patient can identify their risk factors for CAD      Goals Met  Nutrition Goals Met: Completed Nutritional Risk Screen;Provided Rate your Plate Survey;Patient states following a low saturated fat diet;Patient follows a low sodium diet;Reviewed Dietitian schedule      Height, Weight, and  BMI  Weight: 166 lb 4.8 oz (75.4 kg)  Height: 5' 7\" (1.702 m)  BMI: 26.04      Nutrition Follow-up  Follow-up/Discharge: Pt reports he follows low sodium diet.  He does read labels for sodium content.  He also eats fruits and vegetables daily.  Pt does drink up to 4-6 cups of coffee daily.  Discouraged him from drinking that much, but pt not willing to change at this point. Pt will begin a more consistant strength training program at home now that he has done weights 1x since last update. Pt wants to gain weight-he will meet with dietician on 12/4/2020 at 1015 for diet consult appt.         Other Risk Factors  Other Risk Factor Assessment: Reassessment      HTN Risk Factor: Hypertension      Pre Exercise BP: 118/62  Post Exercise BP: 128/62      Hypertension Plan  Goals  HTN Goals: Patient demonstrates understanding of HTN, no goals identified for the next 30 days      Goals Met  HTN Goals Met: Follow low sodium diet;Take medication as prescribed;Exercises regularly      HTN Interventions  HTN Interventions: Diet consult;Therapist/patient discussion;Provide written material      HTN Education " Completed  HTN Education Completed: Low sodium diet;Medication review;Risk factor overview      Tobacco Risk Factor: NA      Risk Factor Follow-up   Follow-up/Discharge: Pt follows low sodium diet.  His BP has been WNLs in rehab.  He reports that he is compliant with his meds,and uses a pillbox.     PSYCHOSOCIAL  Psychosocial Assessment: Reassessment       [facility] [place] Q of L Summary Score: 14 (pre)      PHQ-9 Total Score: 4 (pre)      Psychosocial Risk Factor: Stress      Psychosocial Plan  Interventions  Interventions: Individual education and counseling      Education Completed  Education Completed: Relaxation/Coping Techniques      Goals  Goals (Next 30 days): Improvement in [place] COOP score      Goals Met  Goals Met: Identified Support system;Identify stressors;Practicing stress management skills      Psychosocial Follow-up  Follow-up/Discharge: Pt reports a moderate level of stress.  A lot of his stress is generally from worries about his health and his wife's health.  Pt does feel the regular ex he does at home helps manage his stress.             Patient involved in Goal setting?: Yes

## 2021-06-13 NOTE — PROGRESS NOTES
ITP ASSESSMENT   Assessment Day: 120 Day    Session Number: 14  Precautions: s/p Mitraclip, Arrhythmias with PPM/ICD and recently changed settings    Diagnosis: Valve    Risk Stratification: High    Referring Provider: FAMILIA Huffman  EXERCISE  Exercise Assessment: Reassessment           Exercise Plan  Goals Next 30 days  Goal #1: Establish a regular strength training program at home 2x/week on non rehab days    LTG is to reach a 4-4.5METs by the end of CR program ~ January 2021. Continue to increase METs in CR by trying other modalities and utlize both continous and interval ex to safely progress pt    Education Goals: All goals in this section met    Education Goals Met: Patient can state cardiac s/s and appropriate emergency response.;Has system for taking medication.;Medication review.                          Goals Met  90 day ADL'S goals met: Unable to assess if goals of (doing weights at home ) met due to patient's absence. Pt did do weights 1x in CR on 11/24/20: He did 3lbs 3 sets of 15 reps.    90 day Leisure goals met: Pt has reached a 3-4.3METs-Continue to reach LTG. Change up modalties and continue with both continuous and interval exercise.     90 Day Progress: (S) Pt is limited with attendence due to his concerns for arrythmias and sx's he has had on the 12/14/2020-pt has been following up with his providers at [place] with concerns. Pt wants to be put on hold until he follows up with arrythmia concerns-pt is aware he may need order to return.      60 day ADL'S goals met: Goal not met:Pt did weights 1 x in CR since last update. Pt plans to now start a regular strength training program at home and he plans to do core ex at home vs CR.    60 day Leisure goals met: Goal not met:LTG is to reach a 4-4.5METs. Continue to increase METs in CR by trying other modalities.     60 Day Progression: Pt had a symptomatic/new wide complex arrhythmia on10/2/20. Pt missed CR for several sessions due to need to get better  "control of rhythm and pt f/u with Cardiologist and changed his PPM settings. Continue to progress pt to LTG of 4-4.5METs      30 day ADL'S goals met: Goal not met, will keep same goal. Goal #1: Increase strength and core exercise. Start a regular strength training program 2x/week in CR.     30 day Leisure goals met: Goal not met,will keep same goal. LTG is to reach a 4-4.5METs. Continue to increase METs in CR by trying other modalities.     30 Day Progression: Patient has reached 4.2 METs in cardiac rehab.  Patient was limited with new tachy arrythmia event on 10/2.      Initial ADL's goals met: Pt has returned to all activites (Vacuuming, mopping, TM without cv s/s) pre [place] Clip. He has even been doing above initial home TM goals 3.3mph no elevation. However pt continue to need to increase METs in CR 4.5 METs with current MET at 4.2 on the TM.       Initial Progression: (S) Pt has progressed to 4.2 METs on TM. Continue to reach LTG of 4-4.5METs. Pt was tolerating levels well until today pt had an \"new tachyarrythmia event\" regular wide complex with rate 135bpm which did not change with vagal maneuvers-no change in rate or rhythm likely ?A-Flutter vs SVT and due to pt feeling unstable: diaphoretic sx, lightheadedness, stomach pain and hypotension-pt further evaluated in ER.       Exercise Prescription  Exercise Mode: Treadmill;Bike;Nustep;Arm Erg.;Stairs;Hallway Walking (Continuous and interval exercise)    Frequency: 2x/week    Duration: 30-60 min    Intensity / THR: 20-30 beats above resting heart rate (Karvonen 60-75%:108-120, MIIT 50-60%:100-108 HIIT 80%:124bpm)    RPE 11-14  Progression / Met level: 4.3-4.5+    Resistive Training?: Yes      Current Exercise (mins/week): 1 (Unable to assess-pt is not present for update.)      Interventions  Home Exercise:  Mode: TM/Walking    Frequency: 4-6x/week    Duration: 30-70 minutes      Education Material : Individual education and counseling;Provide written " "material      Education Completed  Exercise Education Completed: Cardiac Anatomy;Signs and Symptoms;Medication review;RPE;Emergency Plan;Home Exercise;Warm up/cool down;FITT Principles;Benefits of Exercise;End point of exercise;BP/HR Reponse to exercise;Stretching;Strength training              Exercise Follow-up/Discharge  Follow up/Discharge: Skilled therapy needed to monitor with currently new arrhythmias and new PPM settings. Monitor for proper cv response. Educate on continous and interval and safely progress pt to LTG of 4.3-4.5METs by end of CR. Change up modalties to progress pt as tolerated.   NUTRITION  Nutrition Assessment: Reassessment      Nutrition Risk Factors:  Nutrition Risk Factors: Dyslipidemia  Cholesterol: 153 (1/3/20)  LDL: 91  HDL: 46  Triglycerides: 85      Nutrition Plan  Interventions  Diet Consult: Completed    Other Nutrition Intervention: Diet Class;Therapist/Pt Discussion;Provide with Written Material    Initial Rate Your Plate Score: 0    Follow-Up Rate Your Plate Score: 0      Education Completed  Nutrition Education Completed: Low Saturated fat diet;Low sodium diet      Goals  Nutrition Goals (Next 30 days): Patient will follow a low sodium diet;Patient will follow a low saturated fat diet;Patient knows appropriate portion size      Goals Met  Nutrition Goals Met: Patient follows a low sodium diet;Patient states following a low saturated fat diet;Patient knows appropriate portion size      Height, Weight, and  BMI  Weight: 168 lb 8 oz (76.4 kg)  Height: 5' 7\" (1.702 m)  BMI: 26.38      Nutrition Follow-up  Follow-up/Discharge: RD diet review completed via Phone 12/4/2020         Other Risk Factors  Other Risk Factor Assessment: Reassessment      HTN Risk Factor: Hypertension      Pre Exercise BP: 114/66  Post Exercise BP: 114/62      Hypertension Plan  Goals  HTN Goals: Patient demonstrates understanding of HTN, no goals identified for the next 30 days      Goals Met  HTN Goals Met: " Follow low sodium diet;Take medication as prescribed;Exercises regularly      HTN Interventions  HTN Interventions: Diet consult;Therapist/patient discussion;Provide written material      HTN Education Completed  HTN Education Completed: Low sodium diet;Medication review;Risk factor overview      Tobacco Risk Factor: NA    Risk Factor Follow-up   Follow-up/Discharge: (S) Will address on next ITP     PSYCHOSOCIAL  Psychosocial Assessment: Reassessment       [facility] COOP Q of L Summary Score: 14      PHQ-9 Total Score: 4      Psychosocial Risk Factor: Stress      Psychosocial Plan  Interventions  Interventions: Individual education and counseling      Education Completed  Education Completed: Relaxation/Coping Techniques      Goals  Goals (Next 30 days): Improvement in [employer] COOP score      Goals Met  Goals Met: Identified Support system;Identify stressors;Practicing stress management skills      Psychosocial Follow-up  Follow-up/Discharge: (S) Will address on next ITP             Patient involved in Goal setting?: (S) No (Pt not available for update. Will address next session)

## 2021-06-14 NOTE — PROGRESS NOTES
ITP ASSESSMENT   Assessment Day: 150 Day    Session Number: 14  Precautions: s/p Mitraclip, Arrhythmias with PPM/ICD and recently changed settings    Diagnosis: Valve    Risk Stratification: High    Referring Provider: Sahil Salvador MD   ITP sent to Dr. Diaz  EXERCISE  Exercise Assessment: Discharge                         Exercise Plan  Goals Next 30 days  ADL'S: Goal #1: Establish a regular strength training program at home 2x/week on non rehab days    Leisure: LTG is to reach a 4-4.5METs by the end of CR program ~ January 2021. Continue to increase METs in CR by trying other modalities and utlize both continous and interval ex to safely progress pt    Education Goals: All goals in this section met    Education Goals Met: Patient can state cardiac s/s and appropriate emergency response.;Has system for taking medication.;Medication review.                          Goals Met  120 Day Progress: Pt is limited with attendence due to his concerns for arrythmias and sx's he has had on the 12/14/2020-pt has been following up with his providers at [place] with concerns. Pt wants to be put on hold until he follows up with arrythmia concerns-pt is aware he may need order to return.  1/14/21 Called patient to check on his plan if he would like to return, states that he is still waiting to hear back from his cardiologist, they were out of town.  Will d/c patient at this time.  After talking with his cardiologist if he plans to return patient aware that he will need an order to return.       90 day ADL'S goals met: Unable to assess if goals of (doing weights at home ) met due to patient's absence. Pt did do weights 1x in CR on 11/24/20: He did 3lbs 3 sets of 15 reps.    90 day Leisure goals met: Pt has reached a 3-4.3METs-Continue to reach LTG. Change up modalties and continue with both continuous and interval exercise.     90 Day Progress: (S) Pt is limited with attendence due to his concerns for arrythmias and sx's he has had  on the 12/14/2020-pt has been following up with his providers at [place] with concerns. Pt wants to be put on hold until he follows up with arrythmia concerns-pt is aware he may need order to return.      60 day ADL'S goals met: Goal not met:Pt did weights 1 x in CR since last update. Pt plans to now start a regular strength training program at home and he plans to do core ex at home vs CR.    60 day Leisure goals met: Goal not met:LTG is to reach a 4-4.5METs. Continue to increase METs in CR by trying other modalities.     60 Day Progression: Pt had a symptomatic/new wide complex arrhythmia on10/2/20. Pt missed CR for several sessions due to need to get better control of rhythm and pt f/u with Cardiologist and changed his PPM settings. Continue to progress pt to LTG of 4-4.5METs      30 day ADL'S goals met: Goal not met, will keep same goal. Goal #1: Increase strength and core exercise. Start a regular strength training program 2x/week in CR.     30 day Leisure goals met: Goal not met,will keep same goal. LTG is to reach a 4-4.5METs. Continue to increase METs in CR by trying other modalities.     30 Day Progression: Patient has reached 4.2 METs in cardiac rehab.  Patient was limited with new tachy arrythmia event on 10/2.      Exercise Prescription  Exercise Mode: Treadmill;Bike;Nustep;Arm Erg.;Stairs;Hallway Walking (Continuous and interval exercise)    Frequency: 2x/week    Duration: 30-60 min    Intensity / THR: 20-30 beats above resting heart rate (Karvonen 60-75%:108-120, MIIT 50-60%:100-108 HIIT 80%:124bpm)    RPE 11-14  Progression / Met level: 4.3-4.5+    Resistive Training?: Yes      Current Exercise (mins/week): 1 (Unable to assess-pt is not present for update.)      Interventions  Home Exercise:  Mode: TM/Walking    Frequency: 4-6x/week    Duration: 30-70 minutes      Education Material : Individual education and counseling;Provide written material      Education Completed  Exercise Education Completed:  "Cardiac Anatomy;Signs and Symptoms;Medication review;RPE;Emergency Plan;Home Exercise;Warm up/cool down;FITT Principles;Benefits of Exercise;End point of exercise;BP/HR Reponse to exercise;Stretching;Strength training              Exercise Follow-up/Discharge  Follow up/Discharge: Called patient to check on his plan if he would like to return, states that he is still waiting to hear back from his cardiologist, they were out of town.  Will d/c patient at this time.  After talking with his cardiologist if he plans to return patient aware that he will need an order to return.  During brief phone call stated that he has been doing some exericse at a low level.   NUTRITION  Nutrition Assessment: Discharge    Nutrition Risk Factors:  Nutrition Risk Factors: Dyslipidemia  Cholesterol: 153 (1/3/20)  LDL: 91  HDL: 46  Triglycerides: 85      Nutrition Plan  Interventions  Diet Consult: Completed    Other Nutrition Intervention: Diet Class;Therapist/Pt Discussion;Provide with Written Material    Initial Rate Your Plate Score: 0    Pre Initial Rate Your Plate Score: 0   Follow-Up Rate Your Plate Score: 0      Education Completed  Nutrition Education Completed: Low Saturated fat diet;Low sodium diet      Goals  Nutrition Goals (Next 30 days): Patient will follow a low sodium diet;Patient will follow a low saturated fat diet;Patient knows appropriate portion size      Goals Met  Nutrition Goals Met: Patient follows a low sodium diet;Patient states following a low saturated fat diet;Patient knows appropriate portion size      Height, Weight, and  BMI  Weight: 168 lb 8 oz (76.4 kg)  Height: 5' 7\" (1.702 m)  BMI: 26.38    Pre BMI: 26.38     Nutrition Follow-up  Follow-up/Discharge: RD diet review completed via Phone 12/4/2020 1/14/21 Will d/c patient due to patient wanting to wait to hear from cardiologist. regarding arrythmias.         Other Risk Factors  Other Risk Factor Assessment: Discharge      HTN Risk Factor: " Hypertension      Pre Exercise BP: 114/66  Post Exercise BP: 114/62      Hypertension Plan  Goals  HTN Goals: Patient demonstrates understanding of HTN, no goals identified for the next 30 days      Goals Met  HTN Goals Met: Follow low sodium diet;Take medication as prescribed;Exercises regularly      HTN Interventions  HTN Interventions: Diet consult;Therapist/patient discussion;Provide written material      HTN Education Completed  HTN Education Completed: Low sodium diet;Medication review;Risk factor overview      Tobacco Risk Factor: NA        Risk Factor Follow-up   Follow-up/Discharge: Unable to reassess, d/c at this time due to patient wanting to wait until he hears from cardiogist due to arryhtmias.     PSYCHOSOCIAL  Psychosocial Assessment: Discharge       [facility] COOP Q of L Summary Score: 14    Pre [facility] COOP Q of L Summary Score: 14     PHQ-9 Total Score: 4    Pre PHQ-9 Total Score: 4     Psychosocial Risk Factor: Stress      Psychosocial Plan  Interventions  If PHQ-9 is >9, send letter to MD  Interventions: Individual education and counseling      Education Completed  Education Completed: Relaxation/Coping Techniques      Goals  Goals (Next 30 days): Improvement in [employer] COOP score      Goals Met  Goals Met: Identified Support system;Identify stressors;Practicing stress management skills      Psychosocial Follow-up  Follow-up/Discharge: Unable to reassess, d/c at this time due to patient wanting to wait until he hears from cardiogist due to arryhtmias.       Patient involved in Goal setting?: No      Signature: _____________________________________________________________    Date: __________________    Time: __________________

## 2021-07-13 DIAGNOSIS — L30.8 OTHER ECZEMA: ICD-10-CM

## 2021-07-13 DIAGNOSIS — L73.9 FOLLICULITIS: ICD-10-CM

## 2021-07-13 DIAGNOSIS — L21.9 DERMATITIS, SEBORRHEIC: ICD-10-CM

## 2021-07-13 RX ORDER — KETOCONAZOLE 20 MG/ML
SHAMPOO TOPICAL
Qty: 100 ML | Refills: 11 | Status: SHIPPED | OUTPATIENT
Start: 2021-07-13 | End: 2021-07-15

## 2021-07-13 RX ORDER — BETAMETHASONE DIPROPIONATE 0.5 MG/G
CREAM TOPICAL
Qty: 180 G | Refills: 4 | Status: SHIPPED | OUTPATIENT
Start: 2021-07-13 | End: 2023-08-24

## 2021-07-13 RX ORDER — CLINDAMYCIN PHOSPHATE 11.9 MG/ML
SOLUTION TOPICAL
Qty: 60 ML | Refills: 6 | Status: SHIPPED | OUTPATIENT
Start: 2021-07-13 | End: 2021-12-21

## 2021-07-15 DIAGNOSIS — L21.9 DERMATITIS, SEBORRHEIC: ICD-10-CM

## 2021-07-16 RX ORDER — KETOCONAZOLE 20 MG/ML
SHAMPOO TOPICAL
Qty: 120 ML | Refills: 11 | Status: SHIPPED | OUTPATIENT
Start: 2021-07-16 | End: 2021-12-21

## 2021-07-16 NOTE — TELEPHONE ENCOUNTER
ketoconazole (NIZORAL) 2 % external shampoo  Last Written Prescription Date:  7/13/2021  Last Fill Quantity: 100,   # refills: 11  Last Office Visit : 11/18/2021  Future Office visit:  None    Routing refill request to provider for review/approval because:  Please resend this order with New adjusted packet size.   The New packets come in 120 mL,  Not 100 mL's.   Please send new updated order to pharm      Thank you       Racquel Leal RN  Central Triage Red Flags/Med Refills

## 2021-09-11 ENCOUNTER — HEALTH MAINTENANCE LETTER (OUTPATIENT)
Age: 81
End: 2021-09-11

## 2021-11-16 ENCOUNTER — TELEPHONE (OUTPATIENT)
Dept: DERMATOLOGY | Facility: CLINIC | Age: 81
End: 2021-11-16
Payer: MEDICARE

## 2021-12-21 ENCOUNTER — OFFICE VISIT (OUTPATIENT)
Dept: DERMATOLOGY | Facility: CLINIC | Age: 81
End: 2021-12-21
Payer: MEDICARE

## 2021-12-21 DIAGNOSIS — L21.9 DERMATITIS, SEBORRHEIC: Primary | ICD-10-CM

## 2021-12-21 DIAGNOSIS — L73.9 FOLLICULITIS: ICD-10-CM

## 2021-12-21 DIAGNOSIS — L57.0 AK (ACTINIC KERATOSIS): ICD-10-CM

## 2021-12-21 PROCEDURE — 17003 DESTRUCT PREMALG LES 2-14: CPT | Performed by: DERMATOLOGY

## 2021-12-21 PROCEDURE — 17000 DESTRUCT PREMALG LESION: CPT | Performed by: DERMATOLOGY

## 2021-12-21 PROCEDURE — 99214 OFFICE O/P EST MOD 30 MIN: CPT | Mod: 25 | Performed by: DERMATOLOGY

## 2021-12-21 RX ORDER — FLUOCINOLONE ACETONIDE 0.1 MG/ML
SOLUTION TOPICAL
Qty: 90 ML | Refills: 11 | Status: SHIPPED | OUTPATIENT
Start: 2021-12-21

## 2021-12-21 RX ORDER — CLINDAMYCIN PHOSPHATE 11.9 MG/ML
SOLUTION TOPICAL
Qty: 60 ML | Refills: 6 | Status: SHIPPED | OUTPATIENT
Start: 2021-12-21 | End: 2023-03-07

## 2021-12-21 RX ORDER — KETOCONAZOLE 20 MG/ML
SHAMPOO TOPICAL
Qty: 120 ML | Refills: 11 | Status: SHIPPED | OUTPATIENT
Start: 2021-12-21 | End: 2023-01-19

## 2021-12-21 ASSESSMENT — PAIN SCALES - GENERAL: PAINLEVEL: NO PAIN (0)

## 2021-12-21 NOTE — PROGRESS NOTES
[facility] Dermatology Note  Encounter Date: Dec 21, 2021  Office Visit     Dermatology Problem List:    1. Rosacea: experiencing increased redness and papules/pustules  - Current treatment: azelaic acid 15% BID and oral doxycycline BID x8 weeks  2.  Eczematous dermatitis: well-controlled  - Current treatment: emollients and betamethasone 0.05%  3.  Seborrheic dermatitis with folliculitis: well-controlled  -Current treatment: ketoconazole shampoo and clindamycin 1% solution  4.  History of basal cell carcinoma, forehead, status post Mohs 08/07/2015.    ____________________________________________    Assessment & Plan:     # Actinic keratosis mid forehead, right cheek x 2   - Cryotherapy performed today (see procedure note(s) below).  - If forehead lesion fails to resolve, he will message me    # Intertrigo- related to new diabetes medication   - Finish terbinafine cream prescribed by urgent care x 3 weeks  -Add ketoconazole shampoo twice weekly as wash to prevent flares    # Seb derm with folliculitis  -continue ketoconazole shampoo and clindamycin lotion  - Add Synalar solution at night for itch    # Rosacea- stable  - Continue Azelaic acid twice daily    # Dermatitis - continue diprosone cream and add ketoconazole shampoo wash to back daily     # History of skin cancer-NERD.       Procedures Performed:   - Cryotherapy procedure note, location(s): face. After verbal consent and discussion of risks and benefits including, but not limited to, dyspigmentation/scar, blister, and pain, 3 lesion(s) was(were) treated with 1-2 mm freeze border for 1-2 cycles with liquid nitrogen. Post cryotherapy instructions were provided.      Follow-up: 1 year(s) in-person, or earlier for new or changing lesions    Staff:     Coleen Smith MD  ____________________________________________    CC: RECHECK (pt states he is here for full body skin check)    HPI:  Mr. Fredrick Quinn is a(n) 81 year old male who  presents today as a return patient for a skin check.  He had a sore on the forehead near his previous skin cancer site.  It has now improved but he does note a rough spot in the area.  He continues to have itch of the scalp which is worse at night.  He is compliant with all of his topical treatments.  His cardiologist has been changing his diabetes medication and he has been getting yeast infections in the groin.  Currently using terbinafine cream prescribed by Urgent care.      Patient is otherwise feeling well, without additional skin concerns.     Labs Reviewed:  N/A    Physical Exam:  Vitals: There were no vitals taken for this visit.  SKIN: Total skin excluding the undergarment areas but including the buttocks and groin was performed. The exam included the head/face, neck, both arms, chest, back, abdomen, both legs, digits and/or nails.   - red scaly papules x 2 right cheek and mid forehead  - There is no erythema, telangectasias, nodularity, or pigmentation on the previous skin cancer site..   - mild erythema of the inguinal creases.   - scalp is clear  - benign nevi.   - No other lesions of concern on areas examined.     Medications:  Current Outpatient Medications   Medication     apixaban ANTICOAGULANT (ELIQUIS) 5 MG tablet     aspirin 325 MG tablet     atorvastatin (LIPITOR) 80 MG tablet     azelaic acid (FINACEA) 15 % external gel     betamethasone dipropionate (DIPROSONE) 0.05 % external cream     clindamycin (CLEOCIN T) 1 % external solution     Flaxseed, Linseed, (FLAXSEED OIL PO)     furosemide (LASIX) 20 MG tablet     ketoconazole (NIZORAL) 2 % external shampoo     losartan (COZAAR) 25 MG tablet     Multiple Vitamins-Minerals (MULTIVITAL PO)     omeprazole (PRILOSEC) 40 MG capsule     potassium chloride SA (K-DUR,KLOR-CON M) 10 MEQ tablet     amiodarone (PACERONE/CODARONE) 200 MG tablet     azelaic acid (AZELEX) 20 % external cream     Azelaic Acid 15 % gel     Desoximetasone 0.05 % CREA      metoprolol (LOPRESSOR) 25 MG tablet     metroNIDAZOLE (METROCREAM) 0.75 % cream     mometasone (ELOCON) 0.1 % solution (lotion)     No current facility-administered medications for this visit.      Past Medical History:   Patient Active Problem List   Diagnosis     Acne     Basal cell carcinoma, forehead s/p mohs 8-7-15     Past Medical History:   Diagnosis Date     Basal cell carcinoma        CC Sahil Salvador MD  [facility]  [address] [place] [address] on close of this encounter.

## 2021-12-21 NOTE — LETTER
12/21/2021       RE: Fredrick Quinn  [address] [address]     Dear Colleague,    Thank you for referring your patient, Fredrick Quinn, to the [facility]MAT[facility] [place] at [facility]. Please see a copy of my visit note below.    [facility] Dermatology Note  Encounter Date: Dec 21, 2021  Office Visit     Dermatology Problem List:    1. Rosacea: experiencing increased redness and papules/pustules  - Current treatment: azelaic acid 15% BID and oral doxycycline BID x8 weeks  2.  Eczematous dermatitis: well-controlled  - Current treatment: emollients and betamethasone 0.05%  3.  Seborrheic dermatitis with folliculitis: well-controlled  -Current treatment: ketoconazole shampoo and clindamycin 1% solution  4.  History of basal cell carcinoma, forehead, status post Mohs 08/07/2015.    ____________________________________________    Assessment & Plan:     # Actinic keratosis mid forehead, right cheek x 2   - Cryotherapy performed today (see procedure note(s) below).  - If forehead lesion fails to resolve, he will message me    # Intertrigo- related to new diabetes medication   - Finish terbinafine cream prescribed by urgent care x 3 weeks  -Add ketoconazole shampoo twice weekly as wash to prevent flares    # Seb derm with folliculitis  -continue ketoconazole shampoo and clindamycin lotion  - Add Synalar solution at night for itch    # Rosacea- stable  - Continue Azelaic acid twice daily    # Dermatitis - continue diprosone cream and add ketoconazole shampoo wash to back daily     # History of skin cancer-NERD.       Procedures Performed:   - Cryotherapy procedure note, location(s): face. After verbal consent and discussion of risks and benefits including, but not limited to, dyspigmentation/scar, blister, and pain, 3 lesion(s) was(were) treated with 1-2 mm freeze border for 1-2 cycles with liquid nitrogen. Post  cryotherapy instructions were provided.      Follow-up: 1 year(s) in-person, or earlier for new or changing lesions    Staff:     Coleen Smith MD  ____________________________________________    CC: RECHECK (pt states he is here for full body skin check)    HPI:  Mr. Fredrick Quinn is a(n) 81 year old male who presents today as a return patient for a skin check.  He had a sore on the forehead near his previous skin cancer site.  It has now improved but he does note a rough spot in the area.  He continues to have itch of the scalp which is worse at night.  He is compliant with all of his topical treatments.  His cardiologist has been changing his diabetes medication and he has been getting yeast infections in the groin.  Currently using terbinafine cream prescribed by Urgent care.      Patient is otherwise feeling well, without additional skin concerns.     Labs Reviewed:  N/A    Physical Exam:  Vitals: There were no vitals taken for this visit.  SKIN: Total skin excluding the undergarment areas but including the buttocks and groin was performed. The exam included the head/face, neck, both arms, chest, back, abdomen, both legs, digits and/or nails.   - red scaly papules x 2 right cheek and mid forehead  - There is no erythema, telangectasias, nodularity, or pigmentation on the previous skin cancer site..   - mild erythema of the inguinal creases.   - scalp is clear  - benign nevi.   - No other lesions of concern on areas examined.     Medications:  Current Outpatient Medications   Medication     apixaban ANTICOAGULANT (ELIQUIS) 5 MG tablet     aspirin 325 MG tablet     atorvastatin (LIPITOR) 80 MG tablet     azelaic acid (FINACEA) 15 % external gel     betamethasone dipropionate (DIPROSONE) 0.05 % external cream     clindamycin (CLEOCIN T) 1 % external solution     Flaxseed, Linseed, (FLAXSEED OIL PO)     furosemide (LASIX) 20 MG tablet     ketoconazole (NIZORAL) 2 % external shampoo     losartan (COZAAR) 25 MG  tablet     Multiple Vitamins-Minerals (MULTIVITAL PO)     omeprazole (PRILOSEC) 40 MG capsule     potassium chloride SA (K-DUR,KLOR-CON M) 10 MEQ tablet     amiodarone (PACERONE/CODARONE) 200 MG tablet     azelaic acid (AZELEX) 20 % external cream     Azelaic Acid 15 % gel     Desoximetasone 0.05 % CREA     metoprolol (LOPRESSOR) 25 MG tablet     metroNIDAZOLE (METROCREAM) 0.75 % cream     mometasone (ELOCON) 0.1 % solution (lotion)     No current facility-administered medications for this visit.      Past Medical History:   Patient Active Problem List   Diagnosis     Acne     Basal cell carcinoma, forehead s/p mohs 8-7-15     Past Medical History:   Diagnosis Date     Basal cell carcinoma        CC Sahil Salvador MD  [facility]  [address] [place] [address] [place] [address] on close of this encounter.

## 2021-12-21 NOTE — PATIENT INSTRUCTIONS
Cryotherapy    What is it?    Use of a very cold liquid, such as liquid nitrogen, to freeze and destroy abnormal skin cells that need to be removed    What should I expect?    Tenderness and redness    A small blister that might grow and fill with dark purple blood. There may be crusting.    More than one treatment may be needed if the lesions do not go away.    How do I care for the treated area?    Gently wash the area with your hands when bathing.    Use a thin layer of Vaseline to help with healing. You may use a Band-Aid.     The area should heal within 7-10 days and may leave behind a pink or lighter color.     Do not use an antibiotic or Neosporin ointment.     You may take acetaminophen (Tylenol) for pain.     Call your doctor if you have:    Severe pain    Signs of infection (warmth, redness, cloudy yellow drainage, and or a bad smell)    Questions or concerns    Who should I call with questions?       [facility]: [phone]       [facility]: [phone]       For urgent needs outside of business hours call the [facility] at [phone] and ask for the dermatology resident on call

## 2021-12-28 DIAGNOSIS — L21.9 DERMATITIS, SEBORRHEIC: Primary | ICD-10-CM

## 2021-12-28 RX ORDER — BETAMETHASONE DIPROPIONATE 0.5 MG/G
LOTION TOPICAL
Qty: 60 ML | Refills: 11 | Status: SHIPPED | OUTPATIENT
Start: 2021-12-28 | End: 2023-04-25

## 2022-04-23 ENCOUNTER — HEALTH MAINTENANCE LETTER (OUTPATIENT)
Age: 82
End: 2022-04-23

## 2022-04-25 DIAGNOSIS — L71.9 ACNE ROSACEA: Primary | ICD-10-CM

## 2022-04-25 RX ORDER — DOXYCYCLINE 100 MG/1
100 CAPSULE ORAL 2 TIMES DAILY
Qty: 60 CAPSULE | Refills: 3 | Status: SHIPPED | OUTPATIENT
Start: 2022-04-25 | End: 2022-08-30

## 2022-05-31 DIAGNOSIS — L71.9 ACNE ROSACEA: ICD-10-CM

## 2022-05-31 RX ORDER — AZELAIC ACID 0.15 G/G
GEL TOPICAL
Qty: 50 G | Refills: 11 | Status: SHIPPED | OUTPATIENT
Start: 2022-05-31 | End: 2022-06-01

## 2022-05-31 NOTE — TELEPHONE ENCOUNTER
Sending to [facility] due to the medication last being filled 10/2020.    Migel Torres, [facility]

## 2022-05-31 NOTE — TELEPHONE ENCOUNTER
From: Coleen Smith   Sent: Monday, May 30, 2022 8:39 PM  To: Annette De Jesus ; [place] Joy   Subject: Fwd: Prescription         Sorry, I just tested positive for Covid and am sick. I will forward to clinic staff to see if they can send.  ---------- Forwarded message ----------  From: Fredrick Quinn <[email or web address]>  Date: Thursday, May 26, 2022  Subject: Prescription  To: Kimberly Bohjanen Dr. Bohjanen, Romana remembered what happened to my hand. Slight damage working in a tight space. Sorry for the inconvenience.    Would it be possible to get a new prescription for Finacea? I have one more tube left and the lead times are now very long.    Thank you!    Fredrick

## 2022-05-31 NOTE — TELEPHONE ENCOUNTER
Received refill request for azelaic acid. Patient chart reviewed. Refill accepted. Low-risk medication safe for long-term use for prior indication. Rx routed to Dr. Smith. Scheduling to call patient to arrange f/u with Dr. Smith as none one file.    Shira Oliveros MD  Dermatology Resident  [facility]

## 2022-06-01 NOTE — TELEPHONE ENCOUNTER
Per email from patient    Please do not send to [facility]. I order this medication from [facility] in [place], where the price is much lower. So please send by e-mail to me or to [facility].  Thank you!

## 2022-06-02 RX ORDER — AZELAIC ACID 0.15 G/G
GEL TOPICAL
Qty: 50 G | Refills: 11 | Status: SHIPPED | OUTPATIENT
Start: 2022-06-02 | End: 2023-12-11

## 2022-08-30 DIAGNOSIS — L71.9 ACNE ROSACEA: ICD-10-CM

## 2022-08-30 RX ORDER — DOXYCYCLINE 100 MG/1
100 CAPSULE ORAL 2 TIMES DAILY
Qty: 60 CAPSULE | Refills: 3 | Status: SHIPPED | OUTPATIENT
Start: 2022-08-30 | End: 2022-12-16

## 2022-08-30 RX ORDER — DOXYCYCLINE 100 MG/1
CAPSULE ORAL
Qty: 60 CAPSULE | Refills: 3 | OUTPATIENT
Start: 2022-08-30

## 2022-10-29 ENCOUNTER — HEALTH MAINTENANCE LETTER (OUTPATIENT)
Age: 82
End: 2022-10-29

## 2022-12-14 DIAGNOSIS — L71.9 ACNE ROSACEA: ICD-10-CM

## 2022-12-15 RX ORDER — DOXYCYCLINE 100 MG/1
CAPSULE ORAL
Qty: 60 CAPSULE | Refills: 3 | OUTPATIENT
Start: 2022-12-15

## 2022-12-15 NOTE — TELEPHONE ENCOUNTER
Received refill request as CALEB. Chart reviewed, refill declined. Should be seen in January to discuss whether this treatment should be continued. CCing Dr. Smith as FYI.    Gela Green MD MPH  PGY4 Medicine/Dermatology Resident

## 2022-12-15 NOTE — TELEPHONE ENCOUNTER
Doxycycline  100MG   Last Written Prescription Date:  8/30/22  Last Fill Quantity: 60,   # refills: 3  Last Office Visit : 12/21/21  Future Office visit:  1/25/23  Routing refill request to provider for review/approval because:  doxycycline BID x8 weeks

## 2022-12-16 RX ORDER — DOXYCYCLINE 100 MG/1
100 CAPSULE ORAL 2 TIMES DAILY
Qty: 60 CAPSULE | Refills: 3 | Status: SHIPPED | OUTPATIENT
Start: 2022-12-16 | End: 2023-05-01

## 2023-01-15 DIAGNOSIS — L21.9 DERMATITIS, SEBORRHEIC: ICD-10-CM

## 2023-01-19 RX ORDER — KETOCONAZOLE 20 MG/ML
SHAMPOO TOPICAL
Qty: 120 ML | Refills: 0 | Status: SHIPPED | OUTPATIENT
Start: 2023-01-19 | End: 2023-06-14

## 2023-02-08 ENCOUNTER — OFFICE VISIT (OUTPATIENT)
Dept: DERMATOLOGY | Facility: CLINIC | Age: 83
End: 2023-02-08
Payer: MEDICARE

## 2023-02-08 DIAGNOSIS — L30.8 OTHER ECZEMA: Primary | ICD-10-CM

## 2023-02-08 PROCEDURE — 99213 OFFICE O/P EST LOW 20 MIN: CPT | Mod: GC | Performed by: DERMATOLOGY

## 2023-02-08 RX ORDER — NITROGLYCERIN 0.4 MG/1
0.4 TABLET SUBLINGUAL
COMMUNITY
Start: 2022-12-06

## 2023-02-08 RX ORDER — PRENATAL VIT 91/IRON/FOLIC/DHA 28-975-200
COMBINATION PACKAGE (EA) ORAL
COMMUNITY
Start: 2022-01-28

## 2023-02-08 RX ORDER — NITROGLYCERIN 0.4 MG/1
TABLET SUBLINGUAL
COMMUNITY
Start: 2022-12-07

## 2023-02-08 RX ORDER — LORATADINE 10 MG/1
1 TABLET ORAL DAILY
COMMUNITY
Start: 2022-12-08

## 2023-02-08 RX ORDER — FLUTICASONE PROPIONATE 50 MCG
2 SPRAY, SUSPENSION (ML) NASAL
COMMUNITY
Start: 2022-12-08

## 2023-02-08 RX ORDER — EPLERENONE 25 MG/1
TABLET, FILM COATED ORAL
COMMUNITY
Start: 2023-01-09

## 2023-02-08 RX ORDER — DAPAGLIFLOZIN 10 MG/1
TABLET, FILM COATED ORAL
COMMUNITY
Start: 2022-12-08

## 2023-02-08 RX ORDER — OMEPRAZOLE 40 MG/1
1 CAPSULE, DELAYED RELEASE ORAL 2 TIMES DAILY
COMMUNITY
Start: 2022-12-02

## 2023-02-08 NOTE — LETTER
2/8/2023       RE: Fredrick Quinn  [address] [address]     Dear Colleague,    Thank you for referring your patient, Fredrick Quinn, to the [facility] [place] at [facility]. Please see a copy of my visit note below.    [facility] Dermatology Note  Encounter Date: Feb 8, 2023  Office Visit     Dermatology Problem List:  1. Rosacea: experiencing increased redness and papules/pustules  - Current treatment: azelaic acid 15% BID and oral doxycycline BID x8 weeks  2.  Eczematous dermatitis: well-controlled  - Current treatment: emollients and betamethasone 0.05%  3.  Seborrheic dermatitis with folliculitis: well-controlled  -Current treatment: ketoconazole shampoo and clindamycin 1% solution  4.  History of basal cell carcinoma, forehead, status post Mohs 08/07/2015.    ____________________________________________    Assessment & Plan:     #. Benign findings - solar lentigines, cherry angiomas, seborrheic keratosis   -No further intervention required.   -Patient reassured of the benign nature of these lesions.  -Patient to report changes or concerns.    #. Skin atrophy and thinning of the circumferential neck  Advised patient discontinue desoximetasone except for active flares.     # Seb derm with folliculitis  - Stable on current regimen  - Continue ketoconazole shampoo and clindamycin lotion  - Contiue Synalar solution at night for itch    # Rosacea- stable  - Continue Azelaic acid twice daily    # Prior history of skin cancer-NERD.     Procedures Performed: None      Follow-up: 1 year(s) in-person, or earlier for new or changing lesions    Staff:  and Resident:    Resident:  Jojo Fernandez  PGY-3, Internal Medicine-Dermatology  Pager: *[phone] or TextPage Link     Staff:   Dr. Coleen Smith I, Coleen Smith MD, saw this patient with the resident and agree with the resident s findings and plan of  care as documented in the resident s note.    ____________________________________________    CC: Skin Check (Spot of concern on patient's forehead. Along with a FBSE.)    HPI:  Mr. Fredrick Quinn is a(n) 82 year old male who presents today as a return patient for a skin check.      -Recently had COVID; is not over the worst of it.   -Notes a spot at the mid forehead that was darker when he was brushing his teeth.          Labs Reviewed:  N/A    Physical Exam:  Vitals: There were no vitals taken for this visit.  SKIN: Total skin excluding the undergarment areas but including the buttocks and groin was performed. The exam included the head/face, neck, both arms, chest, back, abdomen, both legs, digits and/or nails.   - No erythema, telangectasias, nodularity, or pigmentation on the previous skin cancer site..   - Scattered bruising at the circumferential neck.   - Scalp is clear  - Benign nevi.   - No other lesions of concern on areas examined.     Medications:  Current Outpatient Medications   Medication     apixaban ANTICOAGULANT (ELIQUIS) 5 MG tablet     aspirin 325 MG tablet     atorvastatin (LIPITOR) 80 MG tablet     azelaic acid (FINACEA) 15 % external gel     betamethasone dipropionate (DIPROSONE) 0.05 % external cream     betamethasone dipropionate (DIPROSONE) 0.05 % external lotion     clindamycin (CLEOCIN T) 1 % external solution     doxycycline monohydrate (MONODOX) 100 MG capsule     Flaxseed, Linseed, (FLAXSEED OIL PO)     fluticasone (FLONASE) 50 MCG/ACT nasal spray     furosemide (LASIX) 20 MG tablet     ketoconazole (NIZORAL) 2 % external shampoo     loratadine (CLARITIN) 10 MG tablet     losartan (COZAAR) 25 MG tablet     nitroGLYcerin (NITROSTAT) 0.4 MG sublingual tablet     omeprazole (PRILOSEC) 40 MG capsule     omeprazole (PRILOSEC) 40 MG DR capsule     potassium chloride SA (K-DUR,KLOR-CON M) 10 MEQ tablet     terbinafine (LAMISIL) 1 % external cream     amiodarone (PACERONE/CODARONE) 200 MG  tablet     eplerenone (INSPRA) 25 MG tablet     FARXIGA 10 MG TABS tablet     fluocinolone (SYNALAR) 0.01 % solution     Multiple Vitamins-Minerals (MULTIVITAL PO)     nitroGLYcerin (NITROSTAT) 0.4 MG sublingual tablet     No current facility-administered medications for this visit.      Past Medical History:   Patient Active Problem List   Diagnosis     Acne     Basal cell carcinoma, forehead s/p mohs 8-7-15     Past Medical History:   Diagnosis Date     Basal cell carcinoma        CC Sahil Salvador MD  [facility]  [address] [place] [address] on close of this encounter.

## 2023-02-08 NOTE — PATIENT INSTRUCTIONS
Patient Education     Checking for Skin Cancer  You can find cancer early by checking your skin each month. There are 3 kinds of skin cancer. They are melanoma, basal cell carcinoma, and squamous cell carcinoma. Doing monthly skin checks is the best way to find new marks or skin changes. Follow the instructions below for checking your skin.   The ABCDEs of checking moles for melanoma   Check your moles or growths for signs of melanoma using ABCDE:   Asymmetry: the sides of the mole or growth don t match  Border: the edges are ragged, notched, or blurred  Color: the color within the mole or growth varies  Diameter: the mole or growth is larger than 6 mm (size of a pencil eraser)  Evolving: the size, shape, or color of the mole or growth is changing (evolving is not shown in the images below)    Checking for other types of skin cancer  Basal cell carcinoma or squamous cell carcinoma have symptoms such as:     A spot or mole that looks different from all other marks on your skin  Changes in how an area feels, such as itching, tenderness, or pain  Changes in the skin's surface, such as oozing, bleeding, or scaliness  A sore that does not heal  New swelling or redness beyond the border of a mole    Who s at risk?  Anyone can get skin cancer. But you are at greater risk if you have:   Fair skin, light-colored hair, or light-colored eyes  Many moles or abnormal moles on your skin  A history of sunburns from sunlight or tanning beds  A family history of skin cancer  A history of exposure to radiation or chemicals  A weakened immune system  If you have had skin cancer in the past, you are at risk for recurring skin cancer.   How to check your skin  Do your monthly skin checkups in front of a full-length mirror. Check all parts of your body, including your:   Head (ears, face, neck, and scalp)  Torso (front, back, and sides)  Arms (tops, undersides, upper, and lower armpits)  Hands (palms, backs, and fingers, including  under the nails)  Buttocks and genitals  Legs (front, back, and sides)  Feet (tops, soles, toes, including under the nails, and between toes)  If you have a lot of moles, take digital photos of them each month. Make sure to take photos both up close and from a distance. These can help you see if any moles change over time.   Most skin changes are not cancer. But if you see any changes in your skin, call your doctor right away. Only he or she can diagnose a problem. If you have skin cancer, seeing your doctor can be the first step toward getting the treatment that could save your life.   [employer] last reviewed this educational content on 4/1/2019 2000-2020 The "[employer]. [address] [place] [address], [place] [address]. All rights reserved. This information is not intended as a substitute for professional medical care. Always follow your healthcare professional's instructions.       When should I call my doctor?  If you are worsening or not improving, please, contact us or seek urgent care as noted below.     Who should I call with questions (adults)?  [facility] (adult and pediatric): [phone]  [facility] (adult): [phone]  For urgent needs outside of business hours call the [facility] at [phone] and ask for the dermatology resident on call to be paged  If this is a medical emergency and you are unable to reach an ER, Call [phone]    Who should I call with questions (pediatric)?  [facility]- Pediatric Dermatology  Dr. Karen Loyd, Dr. Yeni Benedict, Dr. Geovanna Rodriguez, ASIYA Priest, Dr. Tonia Mcwilliams, Dr. Annette Kim & Dr. Rudy Mendez  Non-urgent nurse triage line; [phone]- Maite and Glenda ULLOA Care Coordinatorriki Luna (/Complex ) [phone]    If you need a prescription refill, please contact your pharmacy. Refills are approved or denied by our  Physicians during normal business hours, Monday through Fridays  Per office policy, refills will not be granted if you have not been seen within the past year (or sooner depending on your child's condition)    Scheduling Information:  Pediatric Appointment Scheduling and Call Center [phone]  Radiology Scheduling- [phone]  Sedation Unit Scheduling- [phone]  [place] Scheduling- General [phone]; Pediatric Dermatology [phone]  Main  Services: [phone]  Maltese: [phone]  Kosovan: [phone]  Hmong/Bahraini/Latvian: [phone]  Preadmission Nursing Department Fax Number: [phone] (Fax all pre-operative paperwork to this number)    For urgent matters arising during evenings, weekends, or holidays that cannot wait for normal business hours please call [phone] and ask for the dermatology resident on call to be paged.

## 2023-02-08 NOTE — NURSING NOTE
Dermatology Rooming Note    Fredrick Quinn's goals for this visit include:   Chief Complaint   Patient presents with     Skin Check     Spot of concern on patient's forehead. Along with a FBSE.     Mike Burleson, EMT-B

## 2023-02-08 NOTE — PROGRESS NOTES
[facility] Dermatology Note  Encounter Date: Feb 8, 2023  Office Visit     Dermatology Problem List:  1. Rosacea: experiencing increased redness and papules/pustules  - Current treatment: azelaic acid 15% BID and oral doxycycline BID x8 weeks  2.  Eczematous dermatitis: well-controlled  - Current treatment: emollients and betamethasone 0.05%  3.  Seborrheic dermatitis with folliculitis: well-controlled  -Current treatment: ketoconazole shampoo and clindamycin 1% solution  4.  History of basal cell carcinoma, forehead, status post Mohs 08/07/2015.    ____________________________________________    Assessment & Plan:     #. Benign findings - solar lentigines, cherry angiomas, seborrheic keratosis   -No further intervention required.   -Patient reassured of the benign nature of these lesions.  -Patient to report changes or concerns.    #. Skin atrophy and thinning of the circumferential neck  Advised patient discontinue desoximetasone except for active flares.     # Seb derm with folliculitis  - Stable on current regimen  - Continue ketoconazole shampoo and clindamycin lotion  - Contiue Synalar solution at night for itch    # Rosacea- stable  - Continue Azelaic acid twice daily    # Prior history of skin cancer-NERD.     Procedures Performed: None      Follow-up: 1 year(s) in-person, or earlier for new or changing lesions    Staff:  and Resident:    Resident:  Jojo Fernandez  PGY-3, Internal Medicine-Dermatology  Pager: *[phone] or TextPage Link     Staff:   Dr. Coleen Smith I, Coleen Smith MD, saw this patient with the resident and agree with the resident s findings and plan of care as documented in the resident s note.    ____________________________________________    CC: Skin Check (Spot of concern on patient's forehead. Along with a FBSE.)    HPI:  Mr. Fredrick Quinn is a(n) 82 year old male who presents today as a return patient for a skin check.      -Recently had COVID; is not over  the worst of it.   -Notes a spot at the mid forehead that was darker when he was brushing his teeth.          Labs Reviewed:  N/A    Physical Exam:  Vitals: There were no vitals taken for this visit.  SKIN: Total skin excluding the undergarment areas but including the buttocks and groin was performed. The exam included the head/face, neck, both arms, chest, back, abdomen, both legs, digits and/or nails.   - No erythema, telangectasias, nodularity, or pigmentation on the previous skin cancer site..   - Scattered bruising at the circumferential neck.   - Scalp is clear  - Benign nevi.   - No other lesions of concern on areas examined.     Medications:  Current Outpatient Medications   Medication     apixaban ANTICOAGULANT (ELIQUIS) 5 MG tablet     aspirin 325 MG tablet     atorvastatin (LIPITOR) 80 MG tablet     azelaic acid (FINACEA) 15 % external gel     betamethasone dipropionate (DIPROSONE) 0.05 % external cream     betamethasone dipropionate (DIPROSONE) 0.05 % external lotion     clindamycin (CLEOCIN T) 1 % external solution     doxycycline monohydrate (MONODOX) 100 MG capsule     Flaxseed, Linseed, (FLAXSEED OIL PO)     fluticasone (FLONASE) 50 MCG/ACT nasal spray     furosemide (LASIX) 20 MG tablet     ketoconazole (NIZORAL) 2 % external shampoo     loratadine (CLARITIN) 10 MG tablet     losartan (COZAAR) 25 MG tablet     nitroGLYcerin (NITROSTAT) 0.4 MG sublingual tablet     omeprazole (PRILOSEC) 40 MG capsule     omeprazole (PRILOSEC) 40 MG DR capsule     potassium chloride SA (K-DUR,KLOR-CON M) 10 MEQ tablet     terbinafine (LAMISIL) 1 % external cream     amiodarone (PACERONE/CODARONE) 200 MG tablet     eplerenone (INSPRA) 25 MG tablet     FARXIGA 10 MG TABS tablet     fluocinolone (SYNALAR) 0.01 % solution     Multiple Vitamins-Minerals (MULTIVITAL PO)     nitroGLYcerin (NITROSTAT) 0.4 MG sublingual tablet     No current facility-administered medications for this visit.      Past Medical History:   Patient  Active Problem List   Diagnosis     Acne     Basal cell carcinoma, forehead s/p mohs 8-7-15     Past Medical History:   Diagnosis Date     Basal cell carcinoma        CC Sahil Salvador MD  [facility]  [address] [place] [address] on close of this encounter.

## 2023-03-03 DIAGNOSIS — L73.9 FOLLICULITIS: ICD-10-CM

## 2023-03-07 RX ORDER — CLINDAMYCIN PHOSPHATE 11.9 MG/ML
SOLUTION TOPICAL PRN
Qty: 60 ML | Refills: 11 | Status: SHIPPED | OUTPATIENT
Start: 2023-03-07 | End: 2024-03-14

## 2023-03-07 NOTE — TELEPHONE ENCOUNTER
Last Clinic Visit: 2/8/2023 [facility] [place]    Refilled qty to 12 months from last visit (process #1/Derm protocol).

## 2023-04-21 DIAGNOSIS — L21.9 DERMATITIS, SEBORRHEIC: ICD-10-CM

## 2023-04-25 RX ORDER — BETAMETHASONE DIPROPIONATE 0.5 MG/G
LOTION TOPICAL
Qty: 60 ML | Refills: 6 | Status: SHIPPED | OUTPATIENT
Start: 2023-04-25 | End: 2024-01-09

## 2023-04-25 NOTE — TELEPHONE ENCOUNTER
BETAMETH DIP LOT 0.05%  betamethasone dipropionate (DIPROSONE) 0.05 % external lotion  Last Written Prescription Date:   12/28/2021  Last Fill Quantity: 60,   # refills: 11  Last Office Visit :  2/8/2023  Future Office visit:  2/12/2024  Routing refill request to provider for review/approval because:  Is Pt still using this med.   As it is mentioned in last visit noted from 2/8/2023, Pt was advise to discontinue and only use during flare ups.      Please advise regarding refills for Pt care.   Thank you     Assessment & Plan: 2/8/2023     #. Benign findings - solar lentigines, cherry angiomas, seborrheic keratosis   -No further intervention required.   -Patient reassured of the benign nature of these lesions.  -Patient to report changes or concerns.     #. Skin atrophy and thinning of the circumferential neck  Advised patient discontinue desoximetasone except for active flares.      # Seb derm with folliculitis  - Stable on current regimen  - Continue ketoconazole shampoo and clindamycin lotion  - Contiue Synalar solution at night for itch     # Rosacea- stable  - Continue Azelaic acid twice daily     # Prior history of skin cancer-NERD.      Racquel Leal RN  Central Triage Red Flags/Med Refills

## 2023-05-01 DIAGNOSIS — L71.9 ACNE ROSACEA: ICD-10-CM

## 2023-05-01 RX ORDER — DOXYCYCLINE 100 MG/1
100 CAPSULE ORAL 2 TIMES DAILY
Qty: 60 CAPSULE | Refills: 3 | Status: SHIPPED | OUTPATIENT
Start: 2023-05-01 | End: 2023-08-24

## 2023-06-11 DIAGNOSIS — L21.9 DERMATITIS, SEBORRHEIC: ICD-10-CM

## 2023-06-14 RX ORDER — KETOCONAZOLE 20 MG/ML
SHAMPOO TOPICAL
Qty: 120 ML | Refills: 6 | Status: SHIPPED | OUTPATIENT
Start: 2023-06-14

## 2023-08-19 DIAGNOSIS — L71.9 ACNE ROSACEA: ICD-10-CM

## 2023-08-24 DIAGNOSIS — L30.8 OTHER ECZEMA: ICD-10-CM

## 2023-08-24 RX ORDER — BETAMETHASONE DIPROPIONATE 0.5 MG/G
CREAM TOPICAL
Qty: 180 G | Refills: 4 | Status: SHIPPED | OUTPATIENT
Start: 2023-08-24

## 2023-08-24 RX ORDER — DOXYCYCLINE 100 MG/1
100 CAPSULE ORAL 2 TIMES DAILY
Qty: 60 CAPSULE | Refills: 5 | Status: SHIPPED | OUTPATIENT
Start: 2023-08-24 | End: 2024-02-12

## 2023-08-24 NOTE — TELEPHONE ENCOUNTER
doxycycline monohydrate (MONODOX) 100 MG   Last Written Prescription Date:  5/1/23  Last Fill Quantity: 60,   # refills: 3  Last Office Visit : 2/8/23  Future Office visit:  2/12/24  RTC  1 YEAR  Routing refill request to provider for review/approval because:  MED OVER  RIDE

## 2023-12-11 DIAGNOSIS — L71.9 ACNE ROSACEA: ICD-10-CM

## 2023-12-11 RX ORDER — AZELAIC ACID 0.15 G/G
GEL TOPICAL
Qty: 50 G | Refills: 11 | Status: SHIPPED | OUTPATIENT
Start: 2023-12-11

## 2024-01-09 DIAGNOSIS — L21.9 DERMATITIS, SEBORRHEIC: ICD-10-CM

## 2024-01-09 RX ORDER — BETAMETHASONE DIPROPIONATE 0.5 MG/G
LOTION TOPICAL
Qty: 60 ML | Refills: 6 | Status: SHIPPED | OUTPATIENT
Start: 2024-01-09

## 2024-01-20 ENCOUNTER — HEALTH MAINTENANCE LETTER (OUTPATIENT)
Age: 84
End: 2024-01-20

## 2024-02-12 ENCOUNTER — OFFICE VISIT (OUTPATIENT)
Dept: DERMATOLOGY | Facility: CLINIC | Age: 84
End: 2024-02-12
Payer: MEDICARE

## 2024-02-12 DIAGNOSIS — L30.8 OTHER ECZEMA: Primary | ICD-10-CM

## 2024-02-12 DIAGNOSIS — L21.9 DERMATITIS, SEBORRHEIC: ICD-10-CM

## 2024-02-12 DIAGNOSIS — L57.0 AK (ACTINIC KERATOSIS): ICD-10-CM

## 2024-02-12 DIAGNOSIS — L71.9 ACNE ROSACEA: ICD-10-CM

## 2024-02-12 PROCEDURE — 17000 DESTRUCT PREMALG LESION: CPT | Mod: GC | Performed by: DERMATOLOGY

## 2024-02-12 PROCEDURE — 99214 OFFICE O/P EST MOD 30 MIN: CPT | Mod: 25 | Performed by: DERMATOLOGY

## 2024-02-12 RX ORDER — AMIODARONE HYDROCHLORIDE 100 MG/1
TABLET ORAL
COMMUNITY
Start: 2019-06-21

## 2024-02-12 RX ORDER — METOPROLOL SUCCINATE 25 MG/1
75 TABLET, EXTENDED RELEASE ORAL
COMMUNITY
Start: 2023-09-14

## 2024-02-12 RX ORDER — DOXYCYCLINE 100 MG/1
100 CAPSULE ORAL 2 TIMES DAILY
Qty: 60 CAPSULE | Refills: 5 | Status: SHIPPED | OUTPATIENT
Start: 2024-02-12 | End: 2024-08-09

## 2024-02-12 NOTE — PROGRESS NOTES
[facility] Dermatology Note  Encounter Date: Feb 12, 2024  Office Visit     Dermatology Problem List:  1. Rosacea: experiencing increased redness and papules/pustules  - Current treatment: azelaic acid 15% BID and oral doxycycline BID (8/24/2023-p)  2.  Eczematous dermatitis: well-controlled  - Current treatment: emollients and betamethasone 0.05%  3.  Seborrheic dermatitis with folliculitis: well-controlled  - Current treatment: ketoconazole shampoo, clindamycin 1% solution, fluocinolone prn  4.  History of basal cell carcinoma, forehead, status post Mohs 08/07/2015.  5. 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   -Patient reassured of the benign nature of these lesions.  -Patient to report changes or concerns.    # AK  L lateral eyebrow w superficial AK. Reviewed etiology and treatment options. Verbal consent obtained.  - see proc note below    # Skin atrophy and thinning of the circumferential neck  Advised patient discontinue desoximetasone except for active flares.     # Seb derm with folliculitis  Well controlled w topicals.   - Stable on current regimen  - Continue ketoconazole shampoo and clindamycin lotion  - Contiue Synalar solution at night for itch    # Rosacea  Pt continues to flare with tapering down doxycyline. Reviewed treatment options and side effects. Continue doxycyline  - Continue Azelaic acid twice daily  - refilled doxycycline    # Prior history of skin cancer  - NERD.     Procedures Performed:  Liquid nitrogen was applied for 10-12 seconds to the skin lesion and the expected blistering or scabbing reaction explained. Do not pick at the area. Patient reminded to expect hypopigmented scars from the procedure. Return if lesion fails to fully resolve.     Follow-up: 1 year(s) in-person, or earlier  for new or changing lesions    Staff:  and Resident:    Shanna CHOW, saw and staffed this patient with the attending. All recommendations, therapies and procedures were pre-staffed with the attending or administered with direct supervision.     I was present for the entire procedure. Coleen Barron MD, MD, saw this patient with the resident and agree with the resident s findings and plan of care as documented in the resident s note.    ____________________________________________    CC: Derm Problem (Fredrick is here today for a skin check. No concerns.)    HPI:  Mr. Fredrick Quinn is a(n) 83 year old male who presents today as a return patient for a skin check.      - no bleeding, oozing, crusting, painful or growing lesions  - reports occasional folliculitis of scalp, nothing that remains or lasts for more than a month  - has frequent bruising of arms and hands that started w eliquis    - reports stable pigmentation of shins since onset 20 years ago  - unclear cause     Labs Reviewed:  N/A    Physical Exam:  Vitals: There were no vitals taken for this visit.  SKIN: Total skin excluding the undergarment areas but including the buttocks and groin was performed. The exam included the head/face, neck, both arms, chest, back, abdomen, both legs, digits and/or nails.   - Pink macule w very fine adherent scale on L lateral eyebrow  - excoriation on R posterior shoulder  - hyperpigmented brown/gray macules on b/l shins  - no clear evidence of dermatitis present on todays exam  - 2 x crusted follicular papules on scalp  - subcutaneous soft nodule on L occipital scalp - reports stable   - many scattered actinic purpura on arms and dorsal hands  - No other lesions of concern on areas examined.     Medications:  Current Outpatient Medications   Medication    amiodarone (PACERONE) 100 MG TABS tablet    apixaban ANTICOAGULANT (ELIQUIS) 5 MG tablet    aspirin 325 MG tablet    atorvastatin (LIPITOR) 80 MG  tablet    azelaic acid (FINACEA) 15 % external gel    betamethasone dipropionate (DIPROSONE) 0.05 % external cream    betamethasone dipropionate (DIPROSONE) 0.05 % external lotion    clindamycin (CLEOCIN T) 1 % external solution    doxycycline monohydrate (MONODOX) 100 MG capsule    eplerenone (INSPRA) 25 MG tablet    FARXIGA 10 MG TABS tablet    Flaxseed, Linseed, (FLAXSEED OIL PO)    fluticasone (FLONASE) 50 MCG/ACT nasal spray    furosemide (LASIX) 20 MG tablet    ketoconazole (NIZORAL) 2 % external shampoo    loratadine (CLARITIN) 10 MG tablet    losartan (COZAAR) 25 MG tablet    metoprolol succinate ER (TOPROL XL) 25 MG 24 hr tablet    nitroGLYcerin (NITROSTAT) 0.4 MG sublingual tablet    omeprazole (PRILOSEC) 40 MG DR capsule    potassium chloride SA (K-DUR,KLOR-CON M) 10 MEQ tablet    terbinafine (LAMISIL) 1 % external cream    amiodarone (PACERONE/CODARONE) 200 MG tablet    fluocinolone (SYNALAR) 0.01 % solution    Multiple Vitamins-Minerals (MULTIVITAL PO)    nitroGLYcerin (NITROSTAT) 0.4 MG sublingual tablet    omeprazole (PRILOSEC) 40 MG capsule     No current facility-administered medications for this visit.      Past Medical History:   Patient Active Problem List   Diagnosis    Acne    Basal cell carcinoma, forehead s/p mohs 8-7-15     Past Medical History:   Diagnosis Date    Basal cell carcinoma        CC Sahil Salvador MD  [facility]  [address] [place] [address] [place] [address] on close of this encounter.

## 2024-02-12 NOTE — PATIENT INSTRUCTIONS
For the rough area on your right temple, this is likely the seborrheic dermatitis. You can treat it with the ketoconazole shampoo (three times weekly) and use some of the fluocinolone oil or betamethasone 0.05% to this area as needed (do not use frequently or for more than two weeks in a row).      Checking for Skin Cancer  You can help find cancer early by checking your skin each month. There are 3 main kinds of skin cancer: melanoma, basal cell carcinoma, and squamous cell carcinoma. Doing monthly skin checks is the best way to find new marks, sores, or skin changes. Follow these instructions for checking your skin.   The ABCDEs of checking moles for melanoma   Check your moles or growths for signs of melanoma using ABCDE:   Asymmetry: The sides of the mole or growth don t match.  Border: The edges are ragged, notched, or blurred.  Color: The color within the mole or growth varies. It could be black, brown, tan, white, or shades of red, gray, or blue.  Diameter: The mole or growth is larger than   inch or 6 mm (size of a pencil eraser).  Evolving: The size, shape, texture, or color of the mole or growth is changing.     ABCDE's of moles on light skin.        ABCDE's of moles on dark skin may be harder to identify.     Checking for other types of skin cancer  Basal cell carcinoma or squamous cell carcinoma cause symptoms like:     A spot or mole that looks different from all other marks on your skin  Changes in how an area feels, such as itching, tenderness, or pain  Changes in the skin's surface, such as oozing, bleeding, or scaliness  A sore that doesn't heal  New swelling, redness, or spread of color beyond the border of a mole    Who s at risk?  Anyone of any skin color can get skin cancer. But you're at greater risk if you have:   Fair skin that freckles easily and burns instead of tanning  Light-colored or red hair  Light-colored eyes  Many moles or abnormal moles on your skin  A long history of unprotected  exposure to sunlight or tanning beds  A history of many blistering sunburns as a child or teen  A family history of skin cancer  Been exposed to radiation or chemicals  A weakened immune system  Been exposed to arsenic  If you've had skin cancer in the past, you're at high risk of having it again.   How to check your skin  Do your monthly skin checkups in front of a full-length mirror. Use a room with good lighting so it's easier to see. Use a hand mirror to look at hard-to-see places like your buttocks and back. You can also have a trusted friend or family member help you with these checks. Check every part of your body, including your:   Head (ears, face, neck, and scalp)  Torso (front, back, sides, and under breasts)  Arms (tops, undersides, and armpits)  Hands (palms, backs, and fingers, including under the nails)  Lower back, buttocks, and genitals  Legs (front, back, and sides)  Feet (tops, soles, toes, including under the nails, and between toes)  Watch for new spots on your skin or a spot that's changing in color, shape, size.   If you have a lot of moles, take digital photos of them each month. Make sure to take photos both up close and from a distance. These can help you see if any moles change over time.   Know your skin  Most skin changes aren't cancer. But if you see any changes in your skin, call your healthcare provider right away. Only they can tell you if a change is a problem. If you have skin cancer, seeing your provider can be the first step to getting the treatment that could save your life.   [employer] last reviewed this educational content on 10/1/2021    4849-7164 [employer]. All rights reserved. This information is not intended as a substitute for professional medical care. Always follow your healthcare professional's instructions.     Cryotherapy    What is it?  Use of a very cold liquid, such as liquid nitrogen, to freeze and destroy abnormal skin cells that need to be  removed    What should I expect?  Tenderness and redness  A small blister that might grow and fill with dark purple blood. There may be crusting.  More than one treatment may be needed if the lesions do not go away.    How do I care for the treated area?  Gently wash the area with your hands when bathing.  Use a thin layer of Vaseline to help with healing. You may use a Band-Aid.   The area should heal within 7-10 days and may leave behind a pink or lighter color.   Do not use an antibiotic or Neosporin ointment.   You may take acetaminophen (Tylenol) for pain.     Call your doctor if you have:  Severe pain  Signs of infection (warmth, redness, cloudy yellow drainage, and or a bad smell)  Questions or concerns    Who should I call with questions?      [facility]: [phone]      [facility]: [phone]      For urgent needs outside of business hours call the [facility] at [phone] and ask for the dermatology resident on call

## 2024-02-12 NOTE — LETTER
2/12/2024       RE: Fredrick Quinn  [address] [address]     Dear Colleague,    Thank you for referring your patient, Fredrick Quinn, to the [facility] [place] at [facility]. Please see a copy of my visit note below.    [facility] Dermatology Note  Encounter Date: Feb 12, 2024  Office Visit     Dermatology Problem List:  1. Rosacea: experiencing increased redness and papules/pustules  - Current treatment: azelaic acid 15% BID and oral doxycycline BID (8/24/2023-p)  2.  Eczematous dermatitis: well-controlled  - Current treatment: emollients and betamethasone 0.05%  3.  Seborrheic dermatitis with folliculitis: well-controlled  - Current treatment: ketoconazole shampoo, clindamycin 1% solution, fluocinolone prn  4.  History of basal cell carcinoma, forehead, status post Mohs 08/07/2015.  5. 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   -Patient reassured of the benign nature of these lesions.  -Patient to report changes or concerns.    # AK  L lateral eyebrow w superficial AK. Reviewed etiology and treatment options. Verbal consent obtained.  - see proc note below    # Skin atrophy and thinning of the circumferential neck  Advised patient discontinue desoximetasone except for active flares.     # Seb derm with folliculitis  Well controlled w topicals.   - Stable on current regimen  - Continue ketoconazole shampoo and clindamycin lotion  - Contiue Synalar solution at night for itch    # Rosacea  Pt continues to flare with tapering down doxycyline. Reviewed treatment options and side effects. Continue doxycyline  - Continue Azelaic acid twice daily  - refilled doxycycline    # Prior history of skin cancer  - NERD.      Procedures Performed:  Liquid nitrogen was applied for 10-12 seconds to the skin lesion and the expected blistering or scabbing reaction explained. Do not pick at the area. Patient reminded to expect hypopigmented scars from the procedure. Return if lesion fails to fully resolve.     Follow-up: 1 year(s) in-person, or earlier for new or changing lesions    Staff:  and Resident:    Shanna CHOW, saw and staffed this patient with the attending. All recommendations, therapies and procedures were pre-staffed with the attending or administered with direct supervision.     I was present for the entire procedure. Coleen CLAYTON I, Coleen Smith MD, saw this patient with the resident and agree with the resident s findings and plan of care as documented in the resident s note.    ____________________________________________    CC: Derm Problem (Fredrick is here today for a skin check. No concerns.)    HPI:  Mr. Fredrick Quinn is a(n) 83 year old male who presents today as a return patient for a skin check.      - no bleeding, oozing, crusting, painful or growing lesions  - reports occasional folliculitis of scalp, nothing that remains or lasts for more than a month  - has frequent bruising of arms and hands that started w eliquis    - reports stable pigmentation of shins since onset 20 years ago  - unclear cause     Labs Reviewed:  N/A    Physical Exam:  Vitals: There were no vitals taken for this visit.  SKIN: Total skin excluding the undergarment areas but including the buttocks and groin was performed. The exam included the head/face, neck, both arms, chest, back, abdomen, both legs, digits and/or nails.   - Pink macule w very fine adherent scale on L lateral eyebrow  - excoriation on R posterior shoulder  - hyperpigmented brown/gray macules on b/l shins  - no clear evidence of dermatitis present on todays exam  - 2 x crusted follicular papules on scalp  - subcutaneous soft nodule on L occipital scalp -  reports stable   - many scattered actinic purpura on arms and dorsal hands  - No other lesions of concern on areas examined.     Medications:  Current Outpatient Medications   Medication    amiodarone (PACERONE) 100 MG TABS tablet    apixaban ANTICOAGULANT (ELIQUIS) 5 MG tablet    aspirin 325 MG tablet    atorvastatin (LIPITOR) 80 MG tablet    azelaic acid (FINACEA) 15 % external gel    betamethasone dipropionate (DIPROSONE) 0.05 % external cream    betamethasone dipropionate (DIPROSONE) 0.05 % external lotion    clindamycin (CLEOCIN T) 1 % external solution    doxycycline monohydrate (MONODOX) 100 MG capsule    eplerenone (INSPRA) 25 MG tablet    FARXIGA 10 MG TABS tablet    Flaxseed, Linseed, (FLAXSEED OIL PO)    fluticasone (FLONASE) 50 MCG/ACT nasal spray    furosemide (LASIX) 20 MG tablet    ketoconazole (NIZORAL) 2 % external shampoo    loratadine (CLARITIN) 10 MG tablet    losartan (COZAAR) 25 MG tablet    metoprolol succinate ER (TOPROL XL) 25 MG 24 hr tablet    nitroGLYcerin (NITROSTAT) 0.4 MG sublingual tablet    omeprazole (PRILOSEC) 40 MG DR capsule    potassium chloride SA (K-DUR,KLOR-CON M) 10 MEQ tablet    terbinafine (LAMISIL) 1 % external cream    amiodarone (PACERONE/CODARONE) 200 MG tablet    fluocinolone (SYNALAR) 0.01 % solution    Multiple Vitamins-Minerals (MULTIVITAL PO)    nitroGLYcerin (NITROSTAT) 0.4 MG sublingual tablet    omeprazole (PRILOSEC) 40 MG capsule     No current facility-administered medications for this visit.      Past Medical History:   Patient Active Problem List   Diagnosis    Acne    Basal cell carcinoma, forehead s/p mohs 8-7-15     Past Medical History:   Diagnosis Date    Basal cell carcinoma        CC Sahil Salvador MD  [facility]  [address] [place] [address] on close of this encounter.

## 2024-03-08 DIAGNOSIS — L73.9 FOLLICULITIS: ICD-10-CM

## 2024-03-14 RX ORDER — CLINDAMYCIN PHOSPHATE 11.9 MG/ML
SOLUTION TOPICAL
Qty: 60 ML | Refills: 11 | Status: SHIPPED | OUTPATIENT
Start: 2024-03-14

## 2024-03-14 NOTE — TELEPHONE ENCOUNTER
clindamycin (CLEOCIN T) 1 % external solution   60 mL 11 3/7/2023       2/12/2024  [facility] [place]      Coleen Smith MD  Dermatology    Continue ketoconazole shampoo and clindamycin lotion

## 2024-08-05 DIAGNOSIS — L71.9 ACNE ROSACEA: ICD-10-CM

## 2024-08-08 NOTE — TELEPHONE ENCOUNTER
Coleen Hyde    ATTENTION: This email was sent to your  [employer] account from an external source. Please use extra caution before clicking links, opening attachments, or replying to or forwarding this email.     Could you get this sent in? Deidra

## 2024-08-08 NOTE — TELEPHONE ENCOUNTER
Dr. Smith, my prescription for doxycycline has . Would it be possible for you to issue a new prescription to [facility].  Thank you!  Fredrick  Sent from my iPad

## 2024-08-09 RX ORDER — DOXYCYCLINE 100 MG/1
100 CAPSULE ORAL 2 TIMES DAILY
Qty: 60 CAPSULE | Refills: 5 | Status: SHIPPED | OUTPATIENT
Start: 2024-08-09

## 2024-08-09 NOTE — TELEPHONE ENCOUNTER
Last seen 2/12/2024 at which time the plan was to continue Doxycycline.       Attending cc'd as an FYI.     Jojo Tolbert MD  Dermatology Resident, PGY-4

## 2024-10-21 DIAGNOSIS — L71.9 ACNE ROSACEA: ICD-10-CM

## 2024-10-22 RX ORDER — AZELAIC ACID 0.15 G/G
GEL TOPICAL
Qty: 50 G | Refills: 5 | Status: SHIPPED | OUTPATIENT
Start: 2024-10-22

## 2024-10-22 NOTE — TELEPHONE ENCOUNTER
LVD:  2/12/2024  [facility] Coleen Carvalho MD  Dermatology     Refilled per protocol.

## 2025-01-26 ENCOUNTER — HEALTH MAINTENANCE LETTER (OUTPATIENT)
Age: 85
End: 2025-01-26

## 2025-02-12 ENCOUNTER — OFFICE VISIT (OUTPATIENT)
Dept: DERMATOLOGY | Facility: CLINIC | Age: 85
End: 2025-02-12
Attending: DERMATOLOGY
Payer: MEDICARE

## 2025-02-12 DIAGNOSIS — L21.9 DERMATITIS, SEBORRHEIC: ICD-10-CM

## 2025-02-12 DIAGNOSIS — L30.8 OTHER ECZEMA: ICD-10-CM

## 2025-02-12 DIAGNOSIS — L71.9 ACNE ROSACEA: Primary | ICD-10-CM

## 2025-02-12 DIAGNOSIS — R20.8 DYSESTHESIA: ICD-10-CM

## 2025-02-12 DIAGNOSIS — L57.8 SUN-DAMAGED SKIN: ICD-10-CM

## 2025-02-12 DIAGNOSIS — C44.319 BASAL CELL CARCINOMA, FOREHEAD: ICD-10-CM

## 2025-02-12 RX ORDER — IVERMECTIN 10 MG/G
CREAM TOPICAL
Qty: 45 G | Refills: 11 | Status: SHIPPED | OUTPATIENT
Start: 2025-02-12

## 2025-02-12 RX ORDER — DOXYCYCLINE 100 MG/1
100 CAPSULE ORAL 2 TIMES DAILY
Qty: 60 CAPSULE | Refills: 5 | Status: SHIPPED | OUTPATIENT
Start: 2025-02-12

## 2025-02-12 RX ORDER — TRIAMCINOLONE ACETONIDE 1 MG/G
CREAM TOPICAL 2 TIMES DAILY
Qty: 30 G | Refills: 3 | Status: SHIPPED | OUTPATIENT
Start: 2025-02-12

## 2025-02-12 ASSESSMENT — PAIN SCALES - GENERAL: PAINLEVEL_OUTOF10: MODERATE PAIN (5)

## 2025-02-12 NOTE — LETTER
2/12/2025       RE: Fredrick Quinn  [address] [address]     Dear Colleague,    Thank you for referring your patient, Fredrick Quinn, to the [facility] [place] at [facility]. Please see a copy of my visit note below.    [facility] Dermatology Note  Encounter Date: Feb 12, 2025  Office Visit     Dermatology Problem List:  1. Rosacea  - Current tx: azelaic acid 15% BID, oral doxycycline BID, topical ivermectin  2. Eczematous dermatitis  - Current tx: emollients and triamcinolone cream  - Previous tx: betamethasone 0.05%  3. Seborrheic dermatitis with folliculitis  - Current tx: ketoconazole shampoo, clindamycin 1% solution, fluiocinolone prn  4. Hx of BCC, forehead, s/p MMS 8/7/2015  5. AK  - LN2 L lateral eyebrow 2/12/2018    ____________________________________________    Assessment & Plan:    # Hx of BCC, foreahead, s/p [facility] 8/7/2015  - No evidence of recurrence on exam  - Return to clinic in one year for annual skin cancer screening exam    # Rosacea, worsening  - Continue current treatment with azelaic acid 15% BID and oral doxy BID as needed  - Discussed starting topical ivermectin if it is covered by insurance  - Return to clinic in 4-months for a skin check    # Eczematous dermatitis, worsening  - Continue with current moisturizing regimen (topical emollient + cotton gloves at night)  - Start topical triamcinolone cream  - Return to clinic in 4-months for a skin check    # Chronic sun-damaged skin, neck, worsening with dysesthesia and excoriations  - Discussed starting topical gapabentin solution to apply to neck to help with the burning sensation  - Discussed stop using topical betamethasone to neck due to increased skin atrophy  - Return to clinic in 4 months for skin check    # Seborrheic dermatitis, stable   - Continue current regimen of ketoconazole shampoo and clindamycin 1% solution    #  Hx of AK, L lateral eyebrow  - No evidence of AK on exam    Procedures Performed:   None:      Follow-up: 4 months in-person for skin check, Follow-up in 1 year for annual skin cancer screening exam    Staff and Medical Student:     Staff: Dr. Sarah Melton, MS4  Medical Student  Dermatology Service    I was present with the medical student who participated in the service and in the documentation of the note. I have verified the history and personally performed the physical exam and medical decision making. I agree with the assessment and plan of care as documented in the note.  Coleen Smith MD   ____________________________________________    CC: Skin Check (FBSE. Hands are 'really dry and cracking', tender skin on R neck pt reports moderate pain associated with this spot.)    HPI:  Mr. Fredrick Quinn is a(n) 84 year old male who presents today as a return patient for skin check.    He has concern about his hands today. He reports he has been managing with a topical emollient and using his prescribed topical steroid (diprosone). He reports increased skin cracking on his knuckles. It is more painful when exposed to water. He has tried lotions/creams on his hands which have not alleviated the dryness. He does not recall what brand it is. He has been keeping his hands cover while outside. He puts vaseline on his hands and covers with cotton gloves overnight.     He has some tender skin on his neck that is tender to palpation. It is sensitive circumferentially. It is exquisitely tender when his neck is shaved. This has been a problem for a few years but has gotten more sensitive in the last year. He feels that the skin is thinner. He denies any bleeding.     He is also concerned that his rosacea is not improving. He continues to use the topical azelaic acid and taking oral doxycyline.     Patient is otherwise feeling well, without additional skin concerns.    Labs:  None reviewed.    Physical  Exam:  Vitals: There were no vitals taken for this visit.  SKIN: Full skin, which includes the head/face, both arms, chest, back, abdomen,both legs, genitalia and/or groin buttocks, digits and/or nails, was examined.  - Patches of greasy, scaling skin on scalp, consistent with seborrheic dermatitis  - Chronic sun-damage circumferentially on neck with disruption of skin barrier and skin atrophy  - Multiple benign appearing nevi on back  - 4 cm epidermal inclusion cyst posterior auricular surface of left ear, nontender without discharge  - dermatitis of hands  - No other lesions of concern on areas examined.     Medications:  Current Outpatient Medications   Medication Sig Dispense Refill     amiodarone (PACERONE) 100 MG TABS tablet        apixaban ANTICOAGULANT (ELIQUIS) 5 MG tablet Take 5 mg by mouth       aspirin 325 MG tablet Take 1 tablet by mouth daily       atorvastatin (LIPITOR) 80 MG tablet Take 80 mg by mouth daily       azelaic acid (FINACIA) 15 % external gel Apply to face daily 50 g 5     betamethasone dipropionate (DIPROSONE) 0.05 % external cream Apply topically 2 times daily. 180 g 4     clindamycin (CLEOCIN T) 1 % external solution APPLY TOPICALLY AS NEEDED TO SCALP FOR PUSTULES AS INSTRUCTED 60 mL 11     doxycycline monohydrate (MONODOX) 100 MG capsule TAKE 1 CAPSULE TWICE A DAY 60 capsule 5     eplerenone (INSPRA) 25 MG tablet        FARXIGA 10 MG TABS tablet        Flaxseed, Linseed, (FLAXSEED OIL PO) Take by mouth daily       fluticasone (FLONASE) 50 MCG/ACT nasal spray Spray 2 sprays in nostril       furosemide (LASIX) 20 MG tablet Take 20 mg by mouth daily       ketoconazole (NIZORAL) 2 % external shampoo USE DAILY AS DIRECTED 120 mL 6     losartan (COZAAR) 25 MG tablet Take 25 mg by mouth daily       metoprolol succinate ER (TOPROL XL) 25 MG 24 hr tablet Take 75 mg by mouth       nitroGLYcerin (NITROSTAT) 0.4 MG sublingual tablet Place 0.4 mg under the tongue       omeprazole (PRILOSEC) 40 MG  DR capsule Take 1 capsule by mouth 2 times daily       potassium chloride SA (K-DUR,KLOR-CON M) 10 MEQ tablet Take by mouth 2 times daily       amiodarone (PACERONE/CODARONE) 200 MG tablet Take 100 mg by mouth       betamethasone dipropionate (DIPROSONE) 0.05 % external lotion APPLY TWO TIMES A DAY TO   SCALP FOR ITCH 60 mL 6     fluocinolone (SYNALAR) 0.01 % solution Use nightly as needed for itch (Patient not taking: Reported on 2/8/2023) 90 mL 11     loratadine (CLARITIN) 10 MG tablet Take 1 tablet by mouth daily (Patient not taking: Reported on 2/12/2025)       Multiple Vitamins-Minerals (MULTIVITAL PO) Take 1 tablet by mouth daily (Patient not taking: Reported on 2/12/2025)       nitroGLYcerin (NITROSTAT) 0.4 MG sublingual tablet  (Patient not taking: Reported on 2/12/2025)       omeprazole (PRILOSEC) 40 MG capsule Take 40 mg by mouth daily (Patient not taking: Reported on 2/12/2024)       terbinafine (LAMISIL) 1 % external cream  (Patient not taking: Reported on 2/12/2025)       No current facility-administered medications for this visit.      Past Medical History:   Patient Active Problem List   Diagnosis     Acne     Basal cell carcinoma, forehead s/p mohs 8-7-15     Past Medical History:   Diagnosis Date     Basal cell carcinoma         CC Coleen Smith MD  [address]  [place] [address] on close of this encounter.      Again, thank you for allowing me to participate in the care of your patient.      Sincerely,    Coleen Smith MD

## 2025-02-12 NOTE — PATIENT INSTRUCTIONS
The redness on your neck is related to chronic sun-damage. To help manage the burning sensation on your neck, we discussed starting a topical gabapentin solution to address the nerve related pain. You should also stop using your topical betamethasone on this area as your skin appears thinner, which is related to overusing the topical steroid.     Today we also discussed starting topical ivermectin to help treat your rosacea. However, if this is not covered by your insurance, please do not  and let us know so we can try something else.     We discussed starting a mid potency topical steroid (triamcinolone cream) to apply to your hands along with your current moisturizing regimen to help improve the rash.     Please follow-up in 4 months for another check of your neck and your hands.

## 2025-02-12 NOTE — PROGRESS NOTES
[facility] Dermatology Note  Encounter Date: Feb 12, 2025  Office Visit     Dermatology Problem List:  1. Rosacea  - Current tx: azelaic acid 15% BID, oral doxycycline BID, topical ivermectin  2. Eczematous dermatitis  - Current tx: emollients and triamcinolone cream  - Previous tx: betamethasone 0.05%  3. Seborrheic dermatitis with folliculitis  - Current tx: ketoconazole shampoo, clindamycin 1% solution, fluiocinolone prn  4. Hx of BCC, forehead, s/p MMS 8/7/2015  5. AK  - LN2 L lateral eyebrow 2/12/2018    ____________________________________________    Assessment & Plan:    # Hx of BCC, foreahead, s/p [facility]MS 8/7/2015  - No evidence of recurrence on exam  - Return to clinic in one year for annual skin cancer screening exam    # Rosacea, worsening  - Continue current treatment with azelaic acid 15% BID and oral doxy BID as needed  - Discussed starting topical ivermectin if it is covered by insurance  - Return to clinic in 4-months for a skin check    # Eczematous dermatitis, worsening  - Continue with current moisturizing regimen (topical emollient + cotton gloves at night)  - Start topical triamcinolone cream  - Return to clinic in 4-months for a skin check    # Chronic sun-damaged skin, neck, worsening with dysesthesia and excoriations  - Discussed starting topical gapabentin solution to apply to neck to help with the burning sensation  - Discussed stop using topical betamethasone to neck due to increased skin atrophy  - Return to clinic in 4 months for skin check    # Seborrheic dermatitis, stable   - Continue current regimen of ketoconazole shampoo and clindamycin 1% solution    # Hx of AK, L lateral eyebrow  - No evidence of AK on exam    Procedures Performed:   None:      Follow-up: 4 months in-person for skin check, Follow-up in 1 year for annual skin cancer screening exam    Staff and Medical Student:     Staff: Dr. Sarah Melton, MS4  Medical Student  Dermatology Service    I  was present with the medical student who participated in the service and in the documentation of the note. I have verified the history and personally performed the physical exam and medical decision making. I agree with the assessment and plan of care as documented in the note.  Coleen Smith MD   ____________________________________________    CC: Skin Check (FBSE. Hands are 'really dry and cracking', tender skin on R neck pt reports moderate pain associated with this spot.)    HPI:  Mr. Fredrick Quinn is a(n) 84 year old male who presents today as a return patient for skin check.    He has concern about his hands today. He reports he has been managing with a topical emollient and using his prescribed topical steroid (diprosone). He reports increased skin cracking on his knuckles. It is more painful when exposed to water. He has tried lotions/creams on his hands which have not alleviated the dryness. He does not recall what brand it is. He has been keeping his hands cover while outside. He puts vaseline on his hands and covers with cotton gloves overnight.     He has some tender skin on his neck that is tender to palpation. It is sensitive circumferentially. It is exquisitely tender when his neck is shaved. This has been a problem for a few years but has gotten more sensitive in the last year. He feels that the skin is thinner. He denies any bleeding.     He is also concerned that his rosacea is not improving. He continues to use the topical azelaic acid and taking oral doxycyline.     Patient is otherwise feeling well, without additional skin concerns.    Labs:  None reviewed.    Physical Exam:  Vitals: There were no vitals taken for this visit.  SKIN: Full skin, which includes the head/face, both arms, chest, back, abdomen,both legs, genitalia and/or groin buttocks, digits and/or nails, was examined.  - Patches of greasy, scaling skin on scalp, consistent with seborrheic dermatitis  - Chronic sun-damage  circumferentially on neck with disruption of skin barrier and skin atrophy  - Multiple benign appearing nevi on back  - 4 cm epidermal inclusion cyst posterior auricular surface of left ear, nontender without discharge  - dermatitis of hands  - No other lesions of concern on areas examined.     Medications:  Current Outpatient Medications   Medication Sig Dispense Refill    amiodarone (PACERONE) 100 MG TABS tablet       apixaban ANTICOAGULANT (ELIQUIS) 5 MG tablet Take 5 mg by mouth      aspirin 325 MG tablet Take 1 tablet by mouth daily      atorvastatin (LIPITOR) 80 MG tablet Take 80 mg by mouth daily      azelaic acid (FINACIA) 15 % external gel Apply to face daily 50 g 5    betamethasone dipropionate (DIPROSONE) 0.05 % external cream Apply topically 2 times daily. 180 g 4    clindamycin (CLEOCIN T) 1 % external solution APPLY TOPICALLY AS NEEDED TO SCALP FOR PUSTULES AS INSTRUCTED 60 mL 11    doxycycline monohydrate (MONODOX) 100 MG capsule TAKE 1 CAPSULE TWICE A DAY 60 capsule 5    eplerenone (INSPRA) 25 MG tablet       FARXIGA 10 MG TABS tablet       Flaxseed, Linseed, (FLAXSEED OIL PO) Take by mouth daily      fluticasone (FLONASE) 50 MCG/ACT nasal spray Spray 2 sprays in nostril      furosemide (LASIX) 20 MG tablet Take 20 mg by mouth daily      ketoconazole (NIZORAL) 2 % external shampoo USE DAILY AS DIRECTED 120 mL 6    losartan (COZAAR) 25 MG tablet Take 25 mg by mouth daily      metoprolol succinate ER (TOPROL XL) 25 MG 24 hr tablet Take 75 mg by mouth      nitroGLYcerin (NITROSTAT) 0.4 MG sublingual tablet Place 0.4 mg under the tongue      omeprazole (PRILOSEC) 40 MG DR capsule Take 1 capsule by mouth 2 times daily      potassium chloride SA (K-DUR,KLOR-CON M) 10 MEQ tablet Take by mouth 2 times daily      amiodarone (PACERONE/CODARONE) 200 MG tablet Take 100 mg by mouth      betamethasone dipropionate (DIPROSONE) 0.05 % external lotion APPLY TWO TIMES A DAY TO   SCALP FOR ITCH 60 mL 6    fluocinolone  (SYNALAR) 0.01 % solution Use nightly as needed for itch (Patient not taking: Reported on 2/8/2023) 90 mL 11    loratadine (CLARITIN) 10 MG tablet Take 1 tablet by mouth daily (Patient not taking: Reported on 2/12/2025)      Multiple Vitamins-Minerals (MULTIVITAL PO) Take 1 tablet by mouth daily (Patient not taking: Reported on 2/12/2025)      nitroGLYcerin (NITROSTAT) 0.4 MG sublingual tablet  (Patient not taking: Reported on 2/12/2025)      omeprazole (PRILOSEC) 40 MG capsule Take 40 mg by mouth daily (Patient not taking: Reported on 2/12/2024)      terbinafine (LAMISIL) 1 % external cream  (Patient not taking: Reported on 2/12/2025)       No current facility-administered medications for this visit.      Past Medical History:   Patient Active Problem List   Diagnosis    Acne    Basal cell carcinoma, forehead s/p mohs 8-7-15     Past Medical History:   Diagnosis Date    Basal cell carcinoma         CC Coleen Smith MD  [address] [address] on close of this encounter.

## 2025-02-12 NOTE — NURSING NOTE
Dermatology Rooming Note    Fredrick Quinn's goals for this visit include:   Chief Complaint   Patient presents with    Skin Check     FBSE. Hands are 'really dry and cracking', tender skin on R neck.      Gio Gutierrez - EMT

## 2025-02-13 ENCOUNTER — TELEPHONE (OUTPATIENT)
Dept: DERMATOLOGY | Facility: CLINIC | Age: 85
End: 2025-02-13
Payer: MEDICARE

## 2025-02-13 DIAGNOSIS — R20.8 DYSESTHESIA: ICD-10-CM

## 2025-02-13 DIAGNOSIS — L71.9 ACNE ROSACEA: Primary | ICD-10-CM

## 2025-02-13 NOTE — TELEPHONE ENCOUNTER
Prior Authorization Retail Medication Request    Medication/Dose: Triamcinolone 0.1% cream  Diagnosis and ICD code (if different than what is on RX):    New/renewal/insurance change PA/secondary ins. PA:  Previously Tried and Failed:    Rationale:      Insurance   Primary: Medicare  Insurance ID:  [id number]    Secondary (if applicable):[facility]  Insurance ID:  [id number]    Pharmacy Information (if different than what is on RX)  Name:    Phone:    Fax:    Clinic Information  Preferred routing pool for dept communication:

## 2025-02-13 NOTE — TELEPHONE ENCOUNTER
[facility] Call Center    Phone Message    May a detailed message be left on voicemail: no     Reason for Call: Medication Question or concern regarding medication   Prescription Clarification  Name of Medication: azelaic acid 15% BID, oral doxycycline BID, topical ivermectin  Prescribing Provider: Dr. Smith   Pharmacy: [facility]   What on the order needs clarification? I first spoke to Marguerite and then pharmacist Khadar Jerry from [facility]/called about Rx . azelaic acid 15% BID, oral doxycycline BID, topical ivermectin. Not covered by pt's insurance/Is looking for an alternative/Also has questions about other medication conflicting. Please review and call back.      Action Taken: Message routed to:  [facility] Derm    Travel Screening: Not Applicable     Date of Service:

## 2025-02-13 NOTE — TELEPHONE ENCOUNTER
Prior Authorization Specialty Medication Request    Medication/Dose: Doxycycline monohydrate   Diagnosis and ICD code (if different than what is on RX):    New/renewal/insurance change PA/secondary ins. PA:  Previously Tried and Failed:      Important Lab Values:   Rationale:     Insurance   Primary: Medicare  Insurance ID:  [id number]    Secondary (if applicable): [facility]  Insurance ID:  [id number]    Pharmacy Information (if different than what is on RX)  Name:    Phone:    Fax:    Clinic Information  Preferred routing pool for dept communication:

## 2025-02-13 NOTE — TELEPHONE ENCOUNTER
Prior Authorization Retail Medication Request    Medication/Dose: Gabapentin 6% soln  Diagnosis and ICD code (if different than what is on RX):    New/renewal/insurance change PA/secondary ins. PA:  Previously Tried and Failed:    Rationale:      Insurance   Primary: Medicare  Insurance ID:  [id number]    Secondary (if applicable):[facility]  Insurance ID:  [id number]    Pharmacy Information (if different than what is on RX)  Name:    Phone:    Fax:    Clinic Information  Preferred routing pool for dept communication:

## 2025-02-17 NOTE — TELEPHONE ENCOUNTER
[facility] calling again to speak to the care team. Phone # [phone]. ref # [id number]. The need to know if the patient will use the azelaic acid gel with the ivermectin. Please call back to discuss. Thanks.

## 2025-02-17 NOTE — TELEPHONE ENCOUNTER
"\"The need to know if the patient will use the azelaic acid gel with the ivermectin.\"    Needs further clarification. Sending to provider.    Migel DOUGHERTY CMA    "

## 2025-02-17 NOTE — TELEPHONE ENCOUNTER
Ivermectin will need a PA due to not being covered by insurance.    Pharmacy informed us that metroNIDAZOLE (METROCREAM) 0.75 % cream is covered by insurance.    Pharmacy would like to know if we would like to switch or proceed with the PA.    Spoke with patient and they would be okay with the switch to Metronidazole due to it being covered.    Migel DOUGHERTY CMA

## 2025-02-17 NOTE — TELEPHONE ENCOUNTER
LOV 02/12/25    # Rosacea, worsening  - Continue current treatment with azelaic acid 15% BID and oral doxy BID as needed  - Discussed starting topical ivermectin if it is covered by insurance  - Return to clinic in 4-months for a skin check

## 2025-02-17 NOTE — TELEPHONE ENCOUNTER
Coleen Smith MD  [facility] D[facility]at[facility] Adult [facility]1 hour ago (10:13 AM)       Yes, he will use both.  Each once daily

## 2025-02-18 NOTE — TELEPHONE ENCOUNTER
Retail Pharmacy Prior Authorization Team   Phone: [phone]    Prior Authorization Not Needed per Insurance    Medication: TRIAMCINOLONE ACETONIDE 0.1 % EX CREA  Insurance Company: [employer] - Phone [phone] Fax [phone]  Expected CoPay: $    Pharmacy Filling the Rx: [facility] - [address]  Pharmacy Notified: YES  Patient Notified: YES (faxed letter to pharmacy and pharmacy will deliver to patient when ready)

## 2025-02-18 NOTE — TELEPHONE ENCOUNTER
Retail Pharmacy Prior Authorization Team   Phone: [phone]    FAXED [facility] TO REQUEST INGREDIENT LIST FOR CMPD

## 2025-02-24 NOTE — TELEPHONE ENCOUNTER
I heard back from the compounding pharmacy -- they needed the patient info therefore I sent it to them to get the ingredient list. I am also going to send the prescription to the compounding pharmacy instead (appears to have been sent to [facility]) and they should be able to work on insurance coverage as well.

## 2025-03-13 DIAGNOSIS — L21.9 DERMATITIS, SEBORRHEIC: ICD-10-CM

## 2025-03-18 RX ORDER — BETAMETHASONE DIPROPIONATE 0.5 MG/G
LOTION TOPICAL
Qty: 60 ML | Refills: 2 | Status: SHIPPED | OUTPATIENT
Start: 2025-03-18

## 2025-03-18 NOTE — TELEPHONE ENCOUNTER
Dr. Smith sent a message asking if we could get this prescription sent in for this patient.    RENE Petit

## 2025-06-30 ENCOUNTER — OFFICE VISIT (OUTPATIENT)
Dept: DERMATOLOGY | Facility: CLINIC | Age: 85
End: 2025-06-30
Payer: MEDICARE

## 2025-06-30 DIAGNOSIS — L30.8 OTHER ECZEMA: ICD-10-CM

## 2025-06-30 DIAGNOSIS — L71.9 ACNE ROSACEA: Primary | ICD-10-CM

## 2025-06-30 DIAGNOSIS — L21.9 DERMATITIS, SEBORRHEIC: ICD-10-CM

## 2025-06-30 DIAGNOSIS — R20.8 DYSESTHESIA: ICD-10-CM

## 2025-06-30 ASSESSMENT — PAIN SCALES - GENERAL: PAINLEVEL_OUTOF10: MILD PAIN (3)

## 2025-06-30 NOTE — PROGRESS NOTES
[facility] Dermatology Note  Encounter Date: Jun 30, 2025  Office Visit     Dermatology Problem List:  1. Rosacea  - Current tx: azelaic acid 15% BID, oral doxycycline BID (stopped 6/30/25), topical ivermectin  2. Eczematous dermatitis  - Current tx: emollients and triamcinolone cream  - Previous tx: betamethasone 0.05%  3. Seborrheic dermatitis with folliculitis  - Current tx: ketoconazole shampoo, clindamycin 1% solution, fluiocinolone prn  4. Hx of BCC, forehead, s/p MMS 8/7/2015  5. AK  - LN2 L lateral eyebrow 2/12/2018    ____________________________________________    Assessment & Plan:     # Rosacea- much improved  - Stop oral doxycycline  - Continue azelaic acid 15% with ivermectin cream daily.     # Dermatitis- controlled  - continue emollients and triamcinolone cream as needed    # dysesthesia of neck- improved  - We did discuss oral gabapentin but since patient is much improved, will continue topica gabapentin solution.  Patient will message if worsening    # Seb derm- stable.   - continue ketoconazole shampoo and clindamycin solution for folliculitis    # Cyst of post auricular scalp- patient is planning to see ENT.  We did discuss excision if necessary but ENT would be best      Follow-up: 1 year(s) in-person, or earlier for new or changing lesions    Staff:     Coleen Smith MD  ____________________________________________    CC: Derm Problem (PT here for a primary dx reports that the rosacea is better. Reports the gabapentin is not helping as desired)    HPI:  Mr. Fredrick Quinn is a(n) 85 year old male who presents today as a return patient for several skin issues.  All are doing well and he wonders if he can stop the doxycycline.  He is battling vertigo and wonder if cyst near ear could be related.  All other rashes are improved    Patient is otherwise feeling well, without additional skin concerns.     Labs Reviewed:  N/A    Physical Exam:  Vitals: There were no vitals taken  for this visit.  SKIN: Focused examination of face, neck and scalp was performed.  - large mobile cyst left post auricular scalp  - no active rash  - No other lesions of concern on areas examined.     Medications:  Current Outpatient Medications   Medication Sig Dispense Refill    amiodarone (PACERONE) 100 MG TABS tablet       apixaban ANTICOAGULANT (ELIQUIS) 5 MG tablet Take 5 mg by mouth      aspirin 325 MG tablet Take 1 tablet by mouth daily      atorvastatin (LIPITOR) 80 MG tablet Take 80 mg by mouth daily      azelaic acid (FINACIA) 15 % external gel Apply to face daily 50 g 5    clindamycin (CLEOCIN T) 1 % external solution APPLY TOPICALLY AS NEEDED TO SCALP FOR PUSTULES AS INSTRUCTED 60 mL 11    doxycycline monohydrate (MONODOX) 100 MG capsule Take 1 capsule (100 mg) by mouth 2 times daily. 60 capsule 5    eplerenone (INSPRA) 25 MG tablet       FARXIGA 10 MG TABS tablet       Flaxseed, Linseed, (FLAXSEED OIL PO) Take by mouth daily      fluticasone (FLONASE) 50 MCG/ACT nasal spray Spray 2 sprays in nostril      furosemide (LASIX) 20 MG tablet Take 20 mg by mouth daily      gabapentin 6 % SOLN solution APPLY 0.5 ML TO AFFECTED AREA ON NECK DAILY 60 mL 11    ivermectin (SOOLANTRA) 1 % cream Apply to the affected areas of the face once daily. Use a pea-size amount for each area of the face (forehead, chin, nose, each cheek) that is affected. Spread as a thin layer, avoiding the eyes and lips. 45 g 11    ketoconazole (NIZORAL) 2 % external shampoo USE DAILY AS DIRECTED 120 mL 6    losartan (COZAAR) 25 MG tablet Take 25 mg by mouth daily      metoprolol succinate ER (TOPROL XL) 25 MG 24 hr tablet Take 75 mg by mouth      metroNIDAZOLE (METROCREAM) 0.75 % external cream Apply topically 2 times daily. 45 g 11    nitroGLYcerin (NITROSTAT) 0.4 MG sublingual tablet Place 0.4 mg under the tongue      omeprazole (PRILOSEC) 40 MG DR capsule Take 1 capsule by mouth 2 times daily      potassium chloride SA (K-DUR,KLOR-CON  M) 10 MEQ tablet Take by mouth 2 times daily      triamcinolone (KENALOG) 0.1 % external cream Apply topically 2 times daily. Apply topically to rash on hands 2 times daily 30 g 3    amiodarone (PACERONE/CODARONE) 200 MG tablet Take 100 mg by mouth      betamethasone dipropionate (DIPROSONE) 0.05 % external cream Apply topically 2 times daily. 180 g 4    betamethasone dipropionate (DIPROSONE) 0.05 % external lotion APPLY TWICE A DAY TO SCALP FOR ITCH. Can cause thinning of skin if used regularly for more than 7 days in a row. 60 mL 2    fluocinolone (SYNALAR) 0.01 % solution Use nightly as needed for itch (Patient not taking: Reported on 6/30/2025) 90 mL 11    loratadine (CLARITIN) 10 MG tablet Take 1 tablet by mouth daily (Patient not taking: Reported on 6/30/2025)      Multiple Vitamins-Minerals (MULTIVITAL PO) Take 1 tablet by mouth daily (Patient not taking: Reported on 2/12/2025)      nitroGLYcerin (NITROSTAT) 0.4 MG sublingual tablet  (Patient not taking: Reported on 2/12/2025)      omeprazole (PRILOSEC) 40 MG capsule Take 40 mg by mouth daily (Patient not taking: Reported on 2/12/2024)      terbinafine (LAMISIL) 1 % external cream  (Patient not taking: Reported on 6/30/2025)       No current facility-administered medications for this visit.      Past Medical History:   Patient Active Problem List   Diagnosis    Acne    Basal cell carcinoma, forehead s/p mohs 8-7-15     Past Medical History:   Diagnosis Date    Basal cell carcinoma        CC Chago Morton MD  [address] [place] [address] on close of this encounter.

## 2025-06-30 NOTE — NURSING NOTE
Dermatology Rooming Note    Fredrick Quinn's goals for this visit include:   Chief Complaint   Patient presents with    Derm Problem     PT here for a primary dx reports that the rosacea is better. Reports the gabapentin is not helping as desired     Gio Gutierrez - EMT

## 2025-06-30 NOTE — LETTER
6/30/2025       RE: Fredrick Quinn  [address] [address]     Dear Colleague,    Thank you for referring your patient, Fredrick Quinn, to the [facility] [place] at [facility]. Please see a copy of my visit note below.    [facility] Dermatology Note  Encounter Date: Jun 30, 2025  Office Visit     Dermatology Problem List:  1. Rosacea  - Current tx: azelaic acid 15% BID, oral doxycycline BID (stopped 6/30/25), topical ivermectin  2. Eczematous dermatitis  - Current tx: emollients and triamcinolone cream  - Previous tx: betamethasone 0.05%  3. Seborrheic dermatitis with folliculitis  - Current tx: ketoconazole shampoo, clindamycin 1% solution, fluiocinolone prn  4. Hx of BCC, forehead, s/p MMS 8/7/2015  5. AK  - LN2 L lateral eyebrow 2/12/2018    ____________________________________________    Assessment & Plan:     # Rosacea- much improved  - Stop oral doxycycline  - Continue azelaic acid 15% with ivermectin cream daily.     # Dermatitis- controlled  - continue emollients and triamcinolone cream as needed    # dysesthesia of neck- improved  - We did discuss oral gabapentin but since patient is much improved, will continue topica gabapentin solution.  Patient will message if worsening    # Seb derm- stable.   - continue ketoconazole shampoo and clindamycin solution for folliculitis    # Cyst of post auricular scalp- patient is planning to see ENT.  We did discuss excision if necessary but ENT would be best      Follow-up: 1 year(s) in-person, or earlier for new or changing lesions    Staff:     Coleen Smith MD  ____________________________________________    CC: Derm Problem (PT here for a primary dx reports that the rosacea is better. Reports the gabapentin is not helping as desired)    HPI:  Mr. Fredrick Quinn is a(n) 85 year old male who presents today as a return patient for several skin  issues.  All are doing well and he wonders if he can stop the doxycycline.  He is battling vertigo and wonder if cyst near ear could be related.  All other rashes are improved    Patient is otherwise feeling well, without additional skin concerns.     Labs Reviewed:  N/A    Physical Exam:  Vitals: There were no vitals taken for this visit.  SKIN: Focused examination of face, neck and scalp was performed.  - large mobile cyst left post auricular scalp  - no active rash  - No other lesions of concern on areas examined.     Medications:  Current Outpatient Medications   Medication Sig Dispense Refill     amiodarone (PACERONE) 100 MG TABS tablet        apixaban ANTICOAGULANT (ELIQUIS) 5 MG tablet Take 5 mg by mouth       aspirin 325 MG tablet Take 1 tablet by mouth daily       atorvastatin (LIPITOR) 80 MG tablet Take 80 mg by mouth daily       azelaic acid (FINACIA) 15 % external gel Apply to face daily 50 g 5     clindamycin (CLEOCIN T) 1 % external solution APPLY TOPICALLY AS NEEDED TO SCALP FOR PUSTULES AS INSTRUCTED 60 mL 11     doxycycline monohydrate (MONODOX) 100 MG capsule Take 1 capsule (100 mg) by mouth 2 times daily. 60 capsule 5     eplerenone (INSPRA) 25 MG tablet        FARXIGA 10 MG TABS tablet        Flaxseed, Linseed, (FLAXSEED OIL PO) Take by mouth daily       fluticasone (FLONASE) 50 MCG/ACT nasal spray Spray 2 sprays in nostril       furosemide (LASIX) 20 MG tablet Take 20 mg by mouth daily       gabapentin 6 % SOLN solution APPLY 0.5 ML TO AFFECTED AREA ON NECK DAILY 60 mL 11     ivermectin (SOOLANTRA) 1 % cream Apply to the affected areas of the face once daily. Use a pea-size amount for each area of the face (forehead, chin, nose, each cheek) that is affected. Spread as a thin layer, avoiding the eyes and lips. 45 g 11     ketoconazole (NIZORAL) 2 % external shampoo USE DAILY AS DIRECTED 120 mL 6     losartan (COZAAR) 25 MG tablet Take 25 mg by mouth daily       metoprolol succinate ER (TOPROL XL)  25 MG 24 hr tablet Take 75 mg by mouth       metroNIDAZOLE (METROCREAM) 0.75 % external cream Apply topically 2 times daily. 45 g 11     nitroGLYcerin (NITROSTAT) 0.4 MG sublingual tablet Place 0.4 mg under the tongue       omeprazole (PRILOSEC) 40 MG DR capsule Take 1 capsule by mouth 2 times daily       potassium chloride SA (K-DUR,KLOR-CON M) 10 MEQ tablet Take by mouth 2 times daily       triamcinolone (KENALOG) 0.1 % external cream Apply topically 2 times daily. Apply topically to rash on hands 2 times daily 30 g 3     amiodarone (PACERONE/CODARONE) 200 MG tablet Take 100 mg by mouth       betamethasone dipropionate (DIPROSONE) 0.05 % external cream Apply topically 2 times daily. 180 g 4     betamethasone dipropionate (DIPROSONE) 0.05 % external lotion APPLY TWICE A DAY TO SCALP FOR ITCH. Can cause thinning of skin if used regularly for more than 7 days in a row. 60 mL 2     fluocinolone (SYNALAR) 0.01 % solution Use nightly as needed for itch (Patient not taking: Reported on 6/30/2025) 90 mL 11     loratadine (CLARITIN) 10 MG tablet Take 1 tablet by mouth daily (Patient not taking: Reported on 6/30/2025)       Multiple Vitamins-Minerals (MULTIVITAL PO) Take 1 tablet by mouth daily (Patient not taking: Reported on 2/12/2025)       nitroGLYcerin (NITROSTAT) 0.4 MG sublingual tablet  (Patient not taking: Reported on 2/12/2025)       omeprazole (PRILOSEC) 40 MG capsule Take 40 mg by mouth daily (Patient not taking: Reported on 2/12/2024)       terbinafine (LAMISIL) 1 % external cream  (Patient not taking: Reported on 6/30/2025)       No current facility-administered medications for this visit.      Past Medical History:   Patient Active Problem List   Diagnosis     Acne     Basal cell carcinoma, forehead s/p mohs 8-7-15     Past Medical History:   Diagnosis Date     Basal cell carcinoma        CC Chago Morton MD  [address] [place] [address] on close of this encounter.      Again, thank you for  allowing me to participate in the care of your patient.      Sincerely,    Coleen Smith MD

## 2025-07-14 ENCOUNTER — TELEPHONE (OUTPATIENT)
Dept: DERMATOLOGY | Facility: CLINIC | Age: 85
End: 2025-07-14
Payer: MEDICARE

## 2025-07-14 NOTE — TELEPHONE ENCOUNTER
7/17 Patient confirmed scheduled appointment:  Date: 6/24/26  Time: 11am  Visit type: Return Dermatology  Provider: Sarah  Location: [facility]  Testing/imaging: na  Additional notes: na        7/14 Left Voicemail (1st Attempt) and Sent Mychart (1st Attempt) for the patient to call back and schedule the following:    Appointment type: Return Dermatology  Provider: Sarah  Return date: 6/30/26  Specialty phone number: [phone]  Additional appointment(s) needed: na  Additonal Notes: na

## 2025-07-21 ENCOUNTER — TRANSCRIBE ORDERS (OUTPATIENT)
Dept: OTHER | Age: 85
End: 2025-07-21

## 2025-07-23 ENCOUNTER — HOSPITAL ENCOUNTER (OUTPATIENT)
Dept: CARDIAC REHAB | Facility: CLINIC | Age: 85
Discharge: HOME | End: 2025-07-23
Attending: INTERNAL MEDICINE
Payer: MEDICARE

## 2025-07-23 LAB
GLUCOSE BLDC GLUCOMTR-MCNC: 211 MG/DL (ref 70–99)
GLUCOSE BLDC GLUCOMTR-MCNC: 222 MG/DL (ref 70–99)

## 2025-07-23 PROCEDURE — 93797 PHYS/QHP OP CAR RHAB WO ECG: CPT

## 2025-07-23 PROCEDURE — 93798 PHYS/QHP OP CAR RHAB W/ECG: CPT

## 2025-07-25 ENCOUNTER — HOSPITAL ENCOUNTER (OUTPATIENT)
Dept: CARDIAC REHAB | Facility: CLINIC | Age: 85
Discharge: HOME | End: 2025-07-25
Attending: INTERNAL MEDICINE
Payer: MEDICARE

## 2025-07-25 PROCEDURE — 93798 PHYS/QHP OP CAR RHAB W/ECG: CPT

## 2025-08-04 ENCOUNTER — HOSPITAL ENCOUNTER (OUTPATIENT)
Dept: CARDIAC REHAB | Facility: CLINIC | Age: 85
Discharge: HOME | End: 2025-08-04
Attending: INTERNAL MEDICINE
Payer: MEDICARE

## 2025-08-04 PROCEDURE — 93798 PHYS/QHP OP CAR RHAB W/ECG: CPT

## 2025-08-06 ENCOUNTER — HOSPITAL ENCOUNTER (OUTPATIENT)
Dept: CARDIAC REHAB | Facility: CLINIC | Age: 85
Discharge: HOME | End: 2025-08-06
Attending: INTERNAL MEDICINE
Payer: MEDICARE

## 2025-08-06 PROCEDURE — 93798 PHYS/QHP OP CAR RHAB W/ECG: CPT

## 2025-08-08 ENCOUNTER — HOSPITAL ENCOUNTER (OUTPATIENT)
Dept: CARDIAC REHAB | Facility: CLINIC | Age: 85
Discharge: HOME | End: 2025-08-08
Attending: INTERNAL MEDICINE
Payer: MEDICARE

## 2025-08-08 PROCEDURE — 93798 PHYS/QHP OP CAR RHAB W/ECG: CPT

## 2025-08-11 ENCOUNTER — HOSPITAL ENCOUNTER (OUTPATIENT)
Dept: CARDIAC REHAB | Facility: CLINIC | Age: 85
Discharge: HOME | End: 2025-08-11
Attending: INTERNAL MEDICINE
Payer: MEDICARE

## 2025-08-11 PROCEDURE — 93798 PHYS/QHP OP CAR RHAB W/ECG: CPT

## 2025-08-13 ENCOUNTER — HOSPITAL ENCOUNTER (OUTPATIENT)
Dept: CARDIAC REHAB | Facility: CLINIC | Age: 85
Discharge: HOME | End: 2025-08-13
Attending: INTERNAL MEDICINE
Payer: MEDICARE

## 2025-08-13 PROCEDURE — 93798 PHYS/QHP OP CAR RHAB W/ECG: CPT

## 2025-08-18 ENCOUNTER — HOSPITAL ENCOUNTER (OUTPATIENT)
Dept: CARDIAC REHAB | Facility: CLINIC | Age: 85
Discharge: HOME | End: 2025-08-18
Attending: INTERNAL MEDICINE
Payer: MEDICARE

## 2025-08-18 PROCEDURE — 93798 PHYS/QHP OP CAR RHAB W/ECG: CPT

## 2025-08-20 ENCOUNTER — HOSPITAL ENCOUNTER (OUTPATIENT)
Dept: CARDIAC REHAB | Facility: CLINIC | Age: 85
Discharge: HOME | End: 2025-08-20
Attending: INTERNAL MEDICINE
Payer: MEDICARE

## 2025-08-20 PROCEDURE — 93798 PHYS/QHP OP CAR RHAB W/ECG: CPT

## 2025-08-22 ENCOUNTER — HOSPITAL ENCOUNTER (OUTPATIENT)
Dept: CARDIAC REHAB | Facility: CLINIC | Age: 85
Discharge: HOME | End: 2025-08-22
Attending: INTERNAL MEDICINE
Payer: MEDICARE

## 2025-08-22 PROCEDURE — 93798 PHYS/QHP OP CAR RHAB W/ECG: CPT
